# Patient Record
Sex: FEMALE | Race: WHITE | NOT HISPANIC OR LATINO | Employment: FULL TIME | URBAN - METROPOLITAN AREA
[De-identification: names, ages, dates, MRNs, and addresses within clinical notes are randomized per-mention and may not be internally consistent; named-entity substitution may affect disease eponyms.]

---
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
## 2019-02-26 ENCOUNTER — TRANSCRIBE ORDERS (OUTPATIENT)
Dept: LAB | Facility: CLINIC | Age: 19
End: 2019-02-26

## 2019-02-26 ENCOUNTER — HOSPITAL ENCOUNTER (OUTPATIENT)
Dept: CT IMAGING | Facility: HOSPITAL | Age: 19
Discharge: HOME | End: 2019-02-26
Attending: NURSE PRACTITIONER
Payer: COMMERCIAL

## 2019-02-26 ENCOUNTER — APPOINTMENT (OUTPATIENT)
Dept: LAB | Facility: CLINIC | Age: 19
End: 2019-02-26
Payer: COMMERCIAL

## 2019-02-26 ENCOUNTER — OFFICE VISIT (OUTPATIENT)
Dept: FAMILY MEDICINE CLINIC | Facility: CLINIC | Age: 19
End: 2019-02-26
Payer: COMMERCIAL

## 2019-02-26 ENCOUNTER — TELEPHONE (OUTPATIENT)
Dept: FAMILY MEDICINE CLINIC | Facility: CLINIC | Age: 19
End: 2019-02-26

## 2019-02-26 VITALS
OXYGEN SATURATION: 96 % | TEMPERATURE: 98.7 F | SYSTOLIC BLOOD PRESSURE: 110 MMHG | HEIGHT: 63 IN | WEIGHT: 109 LBS | BODY MASS INDEX: 19.31 KG/M2 | HEART RATE: 88 BPM | DIASTOLIC BLOOD PRESSURE: 70 MMHG

## 2019-02-26 DIAGNOSIS — R06.00 DYSPNEA, UNSPECIFIED TYPE: ICD-10-CM

## 2019-02-26 DIAGNOSIS — R00.2 PALPITATIONS: ICD-10-CM

## 2019-02-26 DIAGNOSIS — R06.00 DYSPNEA, UNSPECIFIED TYPE: Primary | ICD-10-CM

## 2019-02-26 DIAGNOSIS — R07.89 OTHER CHEST PAIN: ICD-10-CM

## 2019-02-26 DIAGNOSIS — F41.9 ANXIETY: Primary | ICD-10-CM

## 2019-02-26 DIAGNOSIS — F41.9 ANXIETY: ICD-10-CM

## 2019-02-26 LAB
ALBUMIN SERPL BCP-MCNC: 4.3 G/DL (ref 3.5–5)
ALP SERPL-CCNC: 55 U/L (ref 46–116)
ALT SERPL W P-5'-P-CCNC: 25 U/L (ref 12–78)
ANION GAP SERPL CALCULATED.3IONS-SCNC: 11 MMOL/L (ref 4–13)
APTT PPP: 34 SECONDS (ref 26–38)
AST SERPL W P-5'-P-CCNC: 20 U/L (ref 5–45)
BASOPHILS # BLD AUTO: 0.02 THOUSANDS/ΜL (ref 0–0.1)
BASOPHILS NFR BLD AUTO: 0 % (ref 0–1)
BILIRUB SERPL-MCNC: 0.4 MG/DL (ref 0.2–1)
BUN SERPL-MCNC: 11 MG/DL (ref 5–25)
CALCIUM SERPL-MCNC: 9.6 MG/DL (ref 8.3–10.1)
CHLORIDE SERPL-SCNC: 100 MMOL/L (ref 100–108)
CO2 SERPL-SCNC: 26 MMOL/L (ref 21–32)
CREAT SERPL-MCNC: 0.71 MG/DL (ref 0.6–1.3)
DEPRECATED D DIMER PPP: 293 NG/ML (FEU)
EOSINOPHIL # BLD AUTO: 0.04 THOUSAND/ΜL (ref 0–0.61)
EOSINOPHIL NFR BLD AUTO: 1 % (ref 0–6)
ERYTHROCYTE [DISTWIDTH] IN BLOOD BY AUTOMATED COUNT: 11.6 % (ref 11.6–15.1)
GFR SERPL CREATININE-BSD FRML MDRD: 123 ML/MIN/1.73SQ M
GLUCOSE SERPL-MCNC: 77 MG/DL (ref 65–140)
HCT VFR BLD AUTO: 43 % (ref 34.8–46.1)
HGB BLD-MCNC: 14.9 G/DL (ref 11.5–15.4)
IMM GRANULOCYTES # BLD AUTO: 0.01 THOUSAND/UL (ref 0–0.2)
IMM GRANULOCYTES NFR BLD AUTO: 0 % (ref 0–2)
INR PPP: 1.05 (ref 0.86–1.17)
LYMPHOCYTES # BLD AUTO: 1.74 THOUSANDS/ΜL (ref 0.6–4.47)
LYMPHOCYTES NFR BLD AUTO: 32 % (ref 14–44)
MCH RBC QN AUTO: 31 PG (ref 26.8–34.3)
MCHC RBC AUTO-ENTMCNC: 34.7 G/DL (ref 31.4–37.4)
MCV RBC AUTO: 90 FL (ref 82–98)
MONOCYTES # BLD AUTO: 0.51 THOUSAND/ΜL (ref 0.17–1.22)
MONOCYTES NFR BLD AUTO: 10 % (ref 4–12)
NEUTROPHILS # BLD AUTO: 3.07 THOUSANDS/ΜL (ref 1.85–7.62)
NEUTS SEG NFR BLD AUTO: 57 % (ref 43–75)
NRBC BLD AUTO-RTO: 0 /100 WBCS
PLATELET # BLD AUTO: 205 THOUSANDS/UL (ref 149–390)
PMV BLD AUTO: 10.8 FL (ref 8.9–12.7)
POTASSIUM SERPL-SCNC: 3.9 MMOL/L (ref 3.5–5.3)
PROT SERPL-MCNC: 8.2 G/DL (ref 6.4–8.2)
PROTHROMBIN TIME: 13.4 SECONDS (ref 11.8–14.2)
RBC # BLD AUTO: 4.8 MILLION/UL (ref 3.81–5.12)
SODIUM SERPL-SCNC: 137 MMOL/L (ref 136–145)
TSH SERPL DL<=0.05 MIU/L-ACNC: 1.33 UIU/ML (ref 0.46–3.98)
WBC # BLD AUTO: 5.39 THOUSAND/UL (ref 4.31–10.16)

## 2019-02-26 PROCEDURE — 80053 COMPREHEN METABOLIC PANEL: CPT

## 2019-02-26 PROCEDURE — 71275 CT ANGIOGRAPHY CHEST: CPT

## 2019-02-26 PROCEDURE — 93000 ELECTROCARDIOGRAM COMPLETE: CPT | Performed by: NURSE PRACTITIONER

## 2019-02-26 PROCEDURE — 85025 COMPLETE CBC W/AUTO DIFF WBC: CPT

## 2019-02-26 PROCEDURE — 85730 THROMBOPLASTIN TIME PARTIAL: CPT

## 2019-02-26 PROCEDURE — 85379 FIBRIN DEGRADATION QUANT: CPT

## 2019-02-26 PROCEDURE — 99214 OFFICE O/P EST MOD 30 MIN: CPT | Performed by: NURSE PRACTITIONER

## 2019-02-26 PROCEDURE — 85610 PROTHROMBIN TIME: CPT

## 2019-02-26 PROCEDURE — 83835 ASSAY OF METANEPHRINES: CPT

## 2019-02-26 PROCEDURE — 36415 COLL VENOUS BLD VENIPUNCTURE: CPT

## 2019-02-26 PROCEDURE — 84443 ASSAY THYROID STIM HORMONE: CPT

## 2019-02-26 RX ORDER — SERTRALINE HYDROCHLORIDE 25 MG/1
TABLET, FILM COATED ORAL
Qty: 30 TABLET | Refills: 1 | Status: SHIPPED | OUTPATIENT
Start: 2019-02-26 | End: 2019-03-12 | Stop reason: SDUPTHER

## 2019-02-26 RX ADMIN — IOHEXOL 85 ML: 350 INJECTION, SOLUTION INTRAVENOUS at 17:15

## 2019-02-26 NOTE — PROGRESS NOTES
Assessment/Plan:         Problem List Items Addressed This Visit     Chest pain + dyspnea + palpitations  --MAY possibly be anxiety related, but the fact that she is on an OCP, her symptoms started abruptly, and have been constant/continuous since, raises the possibility of PE  Discussed with Dr Symone Montiel  STAT labs and imaging ordered  CT scheduled for 5pm (two hours from now)  Will call with results  --If workup negative, she may benefit from trial of low dose SSRI (Zoloft) which was helpful for her mom (discussed)  --ER for worsening    Relevant Orders    CBC and differential    Comprehensive metabolic panel    TSH, 3rd generation with Free T4 reflex    D-dimer, quantitative    Metanephrine, Fractionated Plasma Free    CTA chest pe study    Protime-INR, APTT    POCT ECG (Completed)-->NSR, mildly tachycardic (84) for her age and fitness level             Subjective:      Patient ID: Alex Don is a 20 y o  female  Here with mom for complaints of chest heaviness, racing heart, dyspnea, sweats x 1 week  Symptoms started abruptly and have continued continuously since  Prior to this would get these symptoms periodically, which she attributed to anxiety  Mom notes that they are in the process of moving into in-laws house which has been a bit stressful  No other emotional triggers over the past week that would account for her symptoms, however  No N/V, cough, headache, dizziness, fever, calf pain/swelling, recent travel  No fatigue, weakness  On OCP x 6 months without issues  Mom wonders if this may be contributing to her symptoms, however  Period somewhat irregular since starting  No spotting  Denies feeling down/depressed  Lives with parents, two sisters whom she gets along fairly well with  Works full time at [employer]  No significant home or work stressors  Taking some time off from school ([employer]--thinking about nursing)  Supportive boyfriend   Denies feeling threatened, unsafe  No smoking, alcohol, drug use  No caffeine  Mom with history of depression/anxiety  Has done well on Zoloft  Mom denies PH/FH clotting disorders  The following portions of the patient's history were reviewed and updated as appropriate: current medications, past family history, past medical history, past social history, past surgical history and problem list       Review of Systems   Constitutional: Negative for fatigue and fever  HENT: Negative for sore throat  Respiratory: Positive for shortness of breath  Negative for cough  Cardiovascular: Positive for chest pain and palpitations  Negative for leg swelling  Gastrointestinal: Negative for abdominal pain, nausea and vomiting  Neurological: Negative for dizziness and headaches  Psychiatric/Behavioral:        Per HPI         Objective:      /70 (BP Location: Left arm, Patient Position: Sitting, Cuff Size: Standard)   Pulse 88   Temp 98 7 °F (37 1 °C)   Ht 5' 3" (1 6 m)   Wt 49 4 kg (109 lb)   SpO2 96%   BMI 19 31 kg/m²          Physical Exam   Constitutional: She is oriented to person, place, and time  She appears well-developed and well-nourished  HENT:   Head: Normocephalic  Right Ear: External ear normal    Left Ear: External ear normal    Nose: Nose normal    Mouth/Throat: Oropharynx is clear and moist    Eyes: Pupils are equal, round, and reactive to light  Conjunctivae are normal    Neck: Normal range of motion  Neck supple  Cardiovascular: Normal rate, regular rhythm and normal heart sounds  Mildly tachycardic at rest (84)  Pulmonary/Chest: Breath sounds normal  No respiratory distress  RR=20, non-labored at rest  No cough noted  Abdominal: Soft  Bowel sounds are normal  There is no tenderness  Musculoskeletal: She exhibits no edema or tenderness  No calf pain or tenderness  Lymphadenopathy:     She has no cervical adenopathy     Neurological: She is alert and oriented to person, place, and time  She has normal reflexes  Skin: Skin is warm and dry  Psychiatric: She has a normal mood and affect  Her behavior is normal  Judgment and thought content normal    Relaxed  Does not seem anxious

## 2019-03-02 LAB
METANEPH FREE SERPL-MCNC: 46 PG/ML (ref 0–62)
NORMETANEPHRINE SERPL-MCNC: 65 PG/ML (ref 0–145)

## 2019-03-05 ENCOUNTER — TELEPHONE (OUTPATIENT)
Dept: FAMILY MEDICINE CLINIC | Facility: CLINIC | Age: 19
End: 2019-03-05

## 2019-03-05 NOTE — TELEPHONE ENCOUNTER
----- Message from Joi Wu, [address] sent at 3/5/2019  8:13 AM EST -----  Can we let Woodrow's mom know that her last pending blood test (for adrenal condition called "pheochromocytoma") came back normal   Thanks!

## 2019-03-12 ENCOUNTER — OFFICE VISIT (OUTPATIENT)
Dept: FAMILY MEDICINE CLINIC | Facility: CLINIC | Age: 19
End: 2019-03-12
Payer: COMMERCIAL

## 2019-03-12 VITALS
DIASTOLIC BLOOD PRESSURE: 70 MMHG | BODY MASS INDEX: 19.31 KG/M2 | SYSTOLIC BLOOD PRESSURE: 110 MMHG | RESPIRATION RATE: 16 BRPM | HEART RATE: 68 BPM | HEIGHT: 63 IN | TEMPERATURE: 96.6 F | WEIGHT: 109 LBS

## 2019-03-12 DIAGNOSIS — F41.9 ANXIETY: ICD-10-CM

## 2019-03-12 PROCEDURE — 3008F BODY MASS INDEX DOCD: CPT | Performed by: NURSE PRACTITIONER

## 2019-03-12 PROCEDURE — 1036F TOBACCO NON-USER: CPT | Performed by: NURSE PRACTITIONER

## 2019-03-12 PROCEDURE — 99214 OFFICE O/P EST MOD 30 MIN: CPT | Performed by: NURSE PRACTITIONER

## 2019-03-12 RX ORDER — SERTRALINE HYDROCHLORIDE 25 MG/1
TABLET, FILM COATED ORAL
Qty: 30 TABLET | Refills: 5 | Status: SHIPPED | OUTPATIENT
Start: 2019-03-12 | End: 2019-03-15 | Stop reason: SDUPTHER

## 2019-03-12 NOTE — PROGRESS NOTES
Assessment/Plan:         Problem List Items Addressed This Visit     Anxiety  --Improved on Zoloft  Wishes to continue unchanged for now  --Regular exercise, other stress relieving measures encouraged  --Declines need for counsellor at this time  Relevant Medications    sertraline (ZOLOFT) 25 mg tablet QD          RTO 3 months      Subjective:      Patient ID: Genevieve Rose is a 20 y o  female  Here as 2 week follow-up to anxiety  See previous note for details  They are in the process of moving into her grandmother's house who will be moving to [place]  This is a primary stressor for her, but she feels like she is coping adequately  Good support system  Exercise helps  Sleeping OK at night  Chest heaviness, heart racing, dyspnea resolved since starting Zoloft, which she is tolerating without AE's  Seems to be helping  No upset stomach, nausea, loose stools  No headaches  Denies feeling down/depressed  Not currently seeing counsellor and does not feel the need to do so at this time  No smoking, alcohol, drug use  No caffeine  The following portions of the patient's history were reviewed and updated as appropriate: current medications, past family history, past medical history, past social history, past surgical history and problem list     Review of Systems   Constitutional: Negative for fatigue and unexpected weight change  Respiratory: Negative for chest tightness and shortness of breath  Cardiovascular: Negative for chest pain and palpitations  Gastrointestinal: Negative for abdominal pain  Neurological: Negative for headaches  Psychiatric/Behavioral: Negative for dysphoric mood  Per HPI         Objective:      /70   Pulse 68   Temp (!) 96 6 °F (35 9 °C)   Resp 16   Ht 5' 3" (1 6 m)   Wt 49 4 kg (109 lb)   LMP 03/12/2019 (Approximate)   Breastfeeding?  No   BMI 19 31 kg/m²          Physical Exam   Constitutional: She is oriented to person, place, and time  She appears well-developed  Cardiovascular: Normal rate and regular rhythm  Pulmonary/Chest: Effort normal and breath sounds normal    Neurological: She is alert and oriented to person, place, and time  Psychiatric: She has a normal mood and affect   Her behavior is normal  Judgment and thought content normal

## 2019-03-15 ENCOUNTER — TELEPHONE (OUTPATIENT)
Dept: FAMILY MEDICINE CLINIC | Facility: OTHER | Age: 19
End: 2019-03-15

## 2019-03-15 DIAGNOSIS — F41.9 ANXIETY: ICD-10-CM

## 2019-03-15 RX ORDER — SERTRALINE HYDROCHLORIDE 25 MG/1
TABLET, FILM COATED ORAL
Qty: 30 TABLET | Refills: 5 | Status: SHIPPED | OUTPATIENT
Start: 2019-03-15 | End: 2019-07-19 | Stop reason: DRUGHIGH

## 2019-03-15 NOTE — TELEPHONE ENCOUNTER
OK, thanks  That's what I thought  Not sure why there was an issue with the insurance  But just to clear things up, I did send a new Rx for ONE tablet only (taking out the part about "May increase to 2 tablets after a week")   Thanks

## 2019-03-15 NOTE — TELEPHONE ENCOUNTER
I was under the impression that she was still taking ONE 25 mg tablet of the sertraline, rather than two 25 mg tablets (= 50 mg total)  Can we call her to verify the dose? If she is taking two 25 mg tablets, I will change the Rx to 50 mg   Thanks

## 2019-03-15 NOTE — TELEPHONE ENCOUNTER
Ellis  (pharmacisit) called and the patients rx for Sertraline needs to be changed to 50 mg and once a day so insurance will pay for it   Thank you

## 2019-07-19 ENCOUNTER — OFFICE VISIT (OUTPATIENT)
Dept: FAMILY MEDICINE CLINIC | Facility: CLINIC | Age: 19
End: 2019-07-19
Payer: COMMERCIAL

## 2019-07-19 VITALS
BODY MASS INDEX: 19.31 KG/M2 | HEIGHT: 63 IN | HEART RATE: 80 BPM | WEIGHT: 109 LBS | DIASTOLIC BLOOD PRESSURE: 60 MMHG | RESPIRATION RATE: 16 BRPM | TEMPERATURE: 97.9 F | SYSTOLIC BLOOD PRESSURE: 102 MMHG

## 2019-07-19 DIAGNOSIS — F41.1 GAD (GENERALIZED ANXIETY DISORDER): Primary | ICD-10-CM

## 2019-07-19 PROCEDURE — 99213 OFFICE O/P EST LOW 20 MIN: CPT | Performed by: FAMILY MEDICINE

## 2019-07-19 PROCEDURE — 1036F TOBACCO NON-USER: CPT | Performed by: FAMILY MEDICINE

## 2019-07-19 PROCEDURE — 3008F BODY MASS INDEX DOCD: CPT | Performed by: FAMILY MEDICINE

## 2019-07-19 NOTE — PROGRESS NOTES
Assessment/Plan:         Diagnoses and all orders for this visit:    ROXANA (generalized anxiety disorder)  -     Discontinue: sertraline (ZOLOFT) 50 mg tablet; Take 1 tablet (50 mg total) by mouth daily for 30 days  -     sertraline (ZOLOFT) 50 mg tablet; Take 1 tablet (50 mg total) by mouth daily for 30 days        Increase dose of Sertraline to 50 mg daily  I reviewed usage and side effects  OV 3 to 6 months  Advised to call if any problems  Patient ID: Saad Figueroa is a 20 y o  female  Follow up visit  History of ROXANA on Sertraline 25 mg daily since 02/2019  Patient reports some improvement but would like to change dose  No side effects  Current medications reviewed  Labs 02/2019 reviewed see note  The following portions of the patient's history were reviewed and updated as appropriate: allergies, current medications, past family history, past medical history, past social history, past surgical history and problem list     Review of Systems   Constitutional: Negative for appetite change, fatigue and unexpected weight change  Gastrointestinal: Negative for constipation, diarrhea, nausea and vomiting  Skin: Negative for rash  Neurological: Negative for dizziness and headaches  Psychiatric/Behavioral: Negative for agitation, dysphoric mood, sleep disturbance and suicidal ideas  The patient is nervous/anxious  See HPI         Objective:      /60   Pulse 80   Temp 97 9 °F (36 6 °C)   Resp 16   Ht 5' 3" (1 6 m)   Wt 49 4 kg (109 lb)   LMP 06/26/2019 (Approximate)   Breastfeeding? No   BMI 19 31 kg/m²          Physical Exam   Constitutional: She appears well-developed and well-nourished  No distress  Psychiatric: She has a normal mood and affect   Her speech is normal and behavior is normal  Judgment and thought content normal  Cognition and memory are normal            Recent Results (from the past 6552 hour(s))   CBC and differential    Collection Time: 02/26/19  5:46 PM Result Value Ref Range    WBC 5 39 4 31 - 10 16 Thousand/uL    RBC 4 80 3 81 - 5 12 Million/uL    Hemoglobin 14 9 11 5 - 15 4 g/dL    Hematocrit 43 0 34 8 - 46 1 %    MCV 90 82 - 98 fL    MCH 31 0 26 8 - 34 3 pg    MCHC 34 7 31 4 - 37 4 g/dL    RDW 11 6 11 6 - 15 1 %    MPV 10 8 8 9 - 12 7 fL    Platelets 229 845 - 685 Thousands/uL    nRBC 0 /100 WBCs    Neutrophils Relative 57 43 - 75 %    Immat GRANS % 0 0 - 2 %    Lymphocytes Relative 32 14 - 44 %    Monocytes Relative 10 4 - 12 %    Eosinophils Relative 1 0 - 6 %    Basophils Relative 0 0 - 1 %    Neutrophils Absolute 3 07 1 85 - 7 62 Thousands/µL    Immature Grans Absolute 0 01 0 00 - 0 20 Thousand/uL    Lymphocytes Absolute 1 74 0 60 - 4 47 Thousands/µL    Monocytes Absolute 0 51 0 17 - 1 22 Thousand/µL    Eosinophils Absolute 0 04 0 00 - 0 61 Thousand/µL    Basophils Absolute 0 02 0 00 - 0 10 Thousands/µL   Comprehensive metabolic panel    Collection Time: 02/26/19  5:46 PM   Result Value Ref Range    Sodium 137 136 - 145 mmol/L    Potassium 3 9 3 5 - 5 3 mmol/L    Chloride 100 100 - 108 mmol/L    CO2 26 21 - 32 mmol/L    ANION GAP 11 4 - 13 mmol/L    BUN 11 5 - 25 mg/dL    Creatinine 0 71 0 60 - 1 30 mg/dL    Glucose 77 65 - 140 mg/dL    Calcium 9 6 8 3 - 10 1 mg/dL    AST 20 5 - 45 U/L    ALT 25 12 - 78 U/L    Alkaline Phosphatase 55 46 - 116 U/L    Total Protein 8 2 6 4 - 8 2 g/dL    Albumin 4 3 3 5 - 5 0 g/dL    Total Bilirubin 0 40 0 20 - 1 00 mg/dL    eGFR 123 ml/min/1 73sq m   TSH, 3rd generation with Free T4 reflex    Collection Time: 02/26/19  5:46 PM   Result Value Ref Range    TSH 3RD GENERATON 1 333 0 463 - 3 980 uIU/mL   D-dimer, quantitative    Collection Time: 02/26/19  5:46 PM   Result Value Ref Range    D-Dimer, Quant 293 <500 ng/ml (FEU)   Metanephrine, Fractionated Plasma Free    Collection Time: 02/26/19  5:46 PM   Result Value Ref Range    Normetanephrine, Free 65 0 - 145 pg/mL    Metanephrine, Free 46 0 - 62 pg/mL   Protime-INR Collection Time: 02/26/19  5:46 PM   Result Value Ref Range    Protime 13 4 11 8 - 14 2 seconds    INR 1 05 0 86 - 1 17   APTT    Collection Time: 02/26/19  5:46 PM   Result Value Ref Range    PTT 34 26 - 38 seconds

## 2020-09-16 ENCOUNTER — OFFICE VISIT (OUTPATIENT)
Dept: FAMILY MEDICINE CLINIC | Facility: CLINIC | Age: 20
End: 2020-09-16
Payer: COMMERCIAL

## 2020-09-16 VITALS
BODY MASS INDEX: 18.95 KG/M2 | SYSTOLIC BLOOD PRESSURE: 98 MMHG | DIASTOLIC BLOOD PRESSURE: 70 MMHG | HEIGHT: 64 IN | OXYGEN SATURATION: 99 % | TEMPERATURE: 98.2 F | HEART RATE: 80 BPM | WEIGHT: 111 LBS

## 2020-09-16 DIAGNOSIS — F33.1 MODERATE EPISODE OF RECURRENT MAJOR DEPRESSIVE DISORDER (HCC): Primary | ICD-10-CM

## 2020-09-16 DIAGNOSIS — F41.0 GENERALIZED ANXIETY DISORDER WITH PANIC ATTACKS: ICD-10-CM

## 2020-09-16 DIAGNOSIS — F41.1 GENERALIZED ANXIETY DISORDER WITH PANIC ATTACKS: ICD-10-CM

## 2020-09-16 DIAGNOSIS — R10.9 FUNCTIONAL ABDOMINAL PAIN SYNDROME: ICD-10-CM

## 2020-09-16 PROCEDURE — 99395 PREV VISIT EST AGE 18-39: CPT | Performed by: FAMILY MEDICINE

## 2020-09-16 RX ORDER — ESCITALOPRAM OXALATE 5 MG/1
5 TABLET ORAL DAILY
Qty: 30 TABLET | Refills: 1 | Status: SHIPPED | OUTPATIENT
Start: 2020-09-16 | End: 2020-10-08

## 2020-09-16 NOTE — ASSESSMENT & PLAN NOTE
Patient with complaint of periumbilical abdominal pain, she has no significant red flag symptoms, there are no significant findings on exam, believe her symptoms are due to functional abdominal pain  Recommended ongoing use of Tums for dyspepsia, she can trial Pepcid as well also recommended use of Pepto-Bismol for her abdominal pain symptoms  It is possible she has some irritable bowel syndrome though she has not endorsed any change in her stool habits  Also discussed that her abdominal pain could be secondary to her current anxiety and depressive symptoms  Had extensive discussion about healthy dietary habits given her poor eating habits  She plans to attempt to make some changes to her eating habits
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
## 2020-09-16 NOTE — ASSESSMENT & PLAN NOTE
Patient with elevated PHQ score today, signs and symptoms of major depressive disorder as well as generalized anxiety disorder with panic attacks  She had previously tried Zoloft, though she did not like the way it made her feel so she self discontinued this medication  Will try Lexapro 5 mg starting today, discussed that if she tolerates the medication and does not have significant side effects and she can increase the dose to 10 mg  Counseled on potential side effects, specifically discussed increased risk of suicidal thoughts or actions in adolescents and young adults when starting an SSRI or other antidepressant medication  Advised patient to discontinue antidepressant, call a friend/family member or the suicide hotline, and/or go to the ER should suicidal thoughts arise  Patient expressed understanding  She is interested in seeing a mental health specialist, will have her schedule for our behavioral health specialist who sees patients in the office  Will plan to check in with patient in the next 4-6 weeks, and plan for in-person follow-up within the next 3 months

## 2020-09-25 ENCOUNTER — SOCIAL WORK (OUTPATIENT)
Dept: BEHAVIORAL/MENTAL HEALTH CLINIC | Facility: CLINIC | Age: 20
End: 2020-09-25
Payer: COMMERCIAL

## 2020-09-25 DIAGNOSIS — F32.A ANXIETY AND DEPRESSION: Primary | ICD-10-CM

## 2020-09-25 DIAGNOSIS — F41.9 ANXIETY AND DEPRESSION: Primary | ICD-10-CM

## 2020-09-25 PROCEDURE — 90834 PSYTX W PT 45 MINUTES: CPT | Performed by: SOCIAL WORKER
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
## 2020-10-08 DIAGNOSIS — F41.0 GENERALIZED ANXIETY DISORDER WITH PANIC ATTACKS: ICD-10-CM

## 2020-10-08 DIAGNOSIS — F41.1 GENERALIZED ANXIETY DISORDER WITH PANIC ATTACKS: ICD-10-CM

## 2020-10-08 DIAGNOSIS — F33.1 MODERATE EPISODE OF RECURRENT MAJOR DEPRESSIVE DISORDER (HCC): ICD-10-CM

## 2020-10-08 RX ORDER — ESCITALOPRAM OXALATE 5 MG/1
TABLET ORAL
Qty: 30 TABLET | Refills: 1 | Status: SHIPPED | OUTPATIENT
Start: 2020-10-08 | End: 2020-11-05

## 2020-11-05 DIAGNOSIS — F41.0 GENERALIZED ANXIETY DISORDER WITH PANIC ATTACKS: ICD-10-CM

## 2020-11-05 DIAGNOSIS — F41.1 GENERALIZED ANXIETY DISORDER WITH PANIC ATTACKS: ICD-10-CM

## 2020-11-05 DIAGNOSIS — F33.1 MODERATE EPISODE OF RECURRENT MAJOR DEPRESSIVE DISORDER (HCC): ICD-10-CM

## 2020-11-05 RX ORDER — ESCITALOPRAM OXALATE 5 MG/1
TABLET ORAL
Qty: 30 TABLET | Refills: 1 | Status: SHIPPED | OUTPATIENT
Start: 2020-11-05 | End: 2020-12-10

## 2020-12-08 DIAGNOSIS — F33.1 MODERATE EPISODE OF RECURRENT MAJOR DEPRESSIVE DISORDER (HCC): ICD-10-CM

## 2020-12-08 DIAGNOSIS — F41.0 GENERALIZED ANXIETY DISORDER WITH PANIC ATTACKS: ICD-10-CM

## 2020-12-08 DIAGNOSIS — F41.1 GENERALIZED ANXIETY DISORDER WITH PANIC ATTACKS: ICD-10-CM

## 2020-12-10 RX ORDER — ESCITALOPRAM OXALATE 5 MG/1
TABLET ORAL
Qty: 30 TABLET | Refills: 1 | Status: SHIPPED | OUTPATIENT
Start: 2020-12-10 | End: 2021-01-07

## 2021-01-07 DIAGNOSIS — F41.1 GENERALIZED ANXIETY DISORDER WITH PANIC ATTACKS: ICD-10-CM

## 2021-01-07 DIAGNOSIS — F33.1 MODERATE EPISODE OF RECURRENT MAJOR DEPRESSIVE DISORDER (HCC): ICD-10-CM

## 2021-01-07 DIAGNOSIS — F41.0 GENERALIZED ANXIETY DISORDER WITH PANIC ATTACKS: ICD-10-CM

## 2021-01-07 RX ORDER — ESCITALOPRAM OXALATE 5 MG/1
TABLET ORAL
Qty: 30 TABLET | Refills: 1 | Status: SHIPPED | OUTPATIENT
Start: 2021-01-07 | End: 2021-02-01

## 2021-02-01 DIAGNOSIS — F33.1 MODERATE EPISODE OF RECURRENT MAJOR DEPRESSIVE DISORDER (HCC): ICD-10-CM

## 2021-02-01 DIAGNOSIS — F41.0 GENERALIZED ANXIETY DISORDER WITH PANIC ATTACKS: ICD-10-CM

## 2021-02-01 DIAGNOSIS — F41.1 GENERALIZED ANXIETY DISORDER WITH PANIC ATTACKS: ICD-10-CM

## 2021-02-01 RX ORDER — ESCITALOPRAM OXALATE 5 MG/1
TABLET ORAL
Qty: 30 TABLET | Refills: 1 | Status: SHIPPED | OUTPATIENT
Start: 2021-02-01 | End: 2021-03-07

## 2021-03-06 DIAGNOSIS — F41.0 GENERALIZED ANXIETY DISORDER WITH PANIC ATTACKS: ICD-10-CM

## 2021-03-06 DIAGNOSIS — F41.1 GENERALIZED ANXIETY DISORDER WITH PANIC ATTACKS: ICD-10-CM

## 2021-03-06 DIAGNOSIS — F33.1 MODERATE EPISODE OF RECURRENT MAJOR DEPRESSIVE DISORDER (HCC): ICD-10-CM

## 2021-03-07 RX ORDER — ESCITALOPRAM OXALATE 5 MG/1
TABLET ORAL
Qty: 30 TABLET | Refills: 1 | Status: SHIPPED | OUTPATIENT
Start: 2021-03-07

## 2021-03-30 DIAGNOSIS — F33.1 MODERATE EPISODE OF RECURRENT MAJOR DEPRESSIVE DISORDER (HCC): ICD-10-CM

## 2021-03-30 DIAGNOSIS — F41.0 GENERALIZED ANXIETY DISORDER WITH PANIC ATTACKS: ICD-10-CM

## 2021-03-30 DIAGNOSIS — F41.1 GENERALIZED ANXIETY DISORDER WITH PANIC ATTACKS: ICD-10-CM

## 2021-03-30 RX ORDER — ESCITALOPRAM OXALATE 5 MG/1
TABLET ORAL
Qty: 30 TABLET | Refills: 1 | OUTPATIENT
Start: 2021-03-30

## 2021-03-30 NOTE — TELEPHONE ENCOUNTER
Patient does not want refill  Would like to ween off medication  How should she do so? Please advise  Thank you

## 2021-03-30 NOTE — TELEPHONE ENCOUNTER
Please have patient schedule a visit to discuss weaning off the medication and instructions for further management

## 2021-04-06 NOTE — PROGRESS NOTES
FAMILY MEDICINE PROGRESS NOTE    Date of Service: 21  Primary Care Provider:   Ayla Laboy MD       Name: Rubi Dyer       : 2000       Age:21 y o  Sex: female      MRN: [id number]      Chief Complaint:Follow-up (3 months)       ASSESSMENT and PLAN:  Rubi Dyer is a 24 y o  female with:     1  Periumbilical abdominal pain  Patient with persistent lower abdominal and pelvic pain, worse prior to menstrual cycle, possible ovarian cyst versus endometriosis versus interstitial cystitis versus IBS/functional abdominal pain  Will obtain US of abdomen and pelvis to evaluate for anatomical causes of pain  - US abdomen and pelvis with transvaginal; Future    2  Pelvic pain  - US abdomen and pelvis with transvaginal; Future    3  Moderate episode of recurrent major depressive disorder ([facility] [address] )  4  ROXANA (generalized anxiety disorder)  Symptoms of anxiety and depression have improved, patient wishes to come off of Lexapro, provided schedule for taper, counseled on potential withdrawal effects  SUBJECTIVE:  Rubi Dyer is a 24 y o  female who presents today with a chief complaint of Follow-up (3 months)  HPI     Depression/Anxiety   Patient was last seen in September for physical  She was started on Lexapro 5mg for anxiety and depression  She also complained of non-specific abdominal pain at that time  She reports her mood has improved since she moved out on her own, so she does not have to be around her dad which is stressful  She is not struggling with sleep any longer  She does wish to come off of Lexapro      PHQ-9 Depression Screening    PHQ-9:   Frequency of the following problems over the past two weeks:      Little interest or pleasure in doing things: 0 - not at all  Feeling down, depressed, or hopeless: 0 - not at all  Trouble falling or staying asleep, or sleeping too much: 0 - not at all  Feeling tired or having little energy: 0 - not at all  Poor appetite or overeatin - not at all  Feeling bad about yourself - or that you are a failure or have let yourself or your family down: 0 - not at all  Trouble concentrating on things, such as reading the newspaper or watching television: 0 - not at all  Moving or speaking so slowly that other people could have noticed  Or the opposite - being so fidgety or restless that you have been moving around a lot more than usual: 0 - not at all  Thoughts that you would be better off dead, or of hurting yourself in some way: 0 - not at all  PHQ-2 Score: 0  PHQ-9 Score: 0         Abdominal/Pelvic Pain  She reports abdominal pain, periumbilical for several months, this has been ongoing since her last visit  She reports that this happens 5 to 6 days a week  She will wake up with the pain, states it feels like a bad cramp  It will go away after she gets up and moves around  Sometimes the pain will last several hours  She reports that that pain is worse in the week before her menstrual cycle  She reports that she will get the pain when she eats fast food or eats junk food  The pain sometimes better when she has a bowel movement  She reports that she has regular bowel movements, not blood in her stool  She is on OCP, she did have some midcycle spotting in February about two weeks before her cycle  She did have some spotting this past cycle as well  Review of Systems   Gastrointestinal: Positive for abdominal pain  Negative for blood in stool, constipation, nausea and vomiting  Genitourinary: Positive for dyspareunia, menstrual problem (midcycle spotting) and pelvic pain  Negative for dysuria, frequency, urgency, vaginal bleeding, vaginal discharge and vaginal pain  Psychiatric/Behavioral: Negative for dysphoric mood and sleep disturbance  The patient is not nervous/anxious  I have reviewed the patient's Past Medical History      Current Outpatient Medications:     escitalopram (LEXAPRO) 5 mg tablet, TAKE 1 TABLET BY MOUTH EVERY DAY, Disp: 30 tablet, Rfl: 1    norgestimate-ethinyl estradiol (ORTHO TRI-CYCLEN LO) 0 18/0 215/0 25 MG-25 MCG per tablet, Take 1 tablet by mouth daily, Disp: , Rfl:     OBJECTIVE:  BP 98/66 (BP Location: Left arm, Patient Position: Sitting, Cuff Size: Standard)   Pulse 78   Temp (!) 97 4 °F (36 3 °C)   Ht 5' 4 4" (1 636 m)   Wt 52 8 kg (116 lb 8 oz)   SpO2 98%   BMI 19 75 kg/m²    BP Readings from Last 3 Encounters:   04/07/21 98/66   09/16/20 98/70   07/19/19 102/60      Wt Readings from Last 3 Encounters:   04/07/21 52 8 kg (116 lb 8 oz)   09/16/20 50 3 kg (111 lb)   07/19/19 49 4 kg (109 lb)      Physical Exam  Constitutional:       General: She is not in acute distress  Appearance: Normal appearance  She is normal weight  She is not ill-appearing or toxic-appearing  HENT:      Head: Normocephalic and atraumatic  Right Ear: External ear normal       Left Ear: External ear normal    Eyes:      Extraocular Movements: Extraocular movements intact  Conjunctiva/sclera: Conjunctivae normal    Neck:      Musculoskeletal: Normal range of motion and neck supple  Cardiovascular:      Rate and Rhythm: Normal rate and regular rhythm  Heart sounds: Normal heart sounds  No murmur  Pulmonary:      Effort: Pulmonary effort is normal  No respiratory distress  Breath sounds: Normal breath sounds  Abdominal:      General: Bowel sounds are normal  There is no distension  Palpations: Abdomen is soft  Tenderness: There is abdominal tenderness in the periumbilical area  There is no guarding or rebound  Comments: Pain distal to the umbilicus, no masses  Abdomen is soft, non distended    Musculoskeletal: Normal range of motion  Skin:     General: Skin is warm and dry  Findings: No erythema or rash  Neurological:      General: No focal deficit present  Mental Status: She is alert and oriented to person, place, and time     Psychiatric:         Mood and Affect: Mood normal  Behavior: Behavior normal                 Return if symptoms worsen or fail to improve  Gigi Vicente MD    Note: Portions of the record have been created with voice recognition software  Occasional wrong word or "sound a like" substitutions may have occurred due to the inherent limitations of voice recognition software  Read the chart carefully and recognize, using context, where substitutions have occurred

## 2021-04-07 ENCOUNTER — OFFICE VISIT (OUTPATIENT)
Dept: FAMILY MEDICINE CLINIC | Facility: CLINIC | Age: 21
End: 2021-04-07

## 2021-04-07 VITALS
SYSTOLIC BLOOD PRESSURE: 98 MMHG | TEMPERATURE: 97.4 F | HEIGHT: 64 IN | HEART RATE: 78 BPM | OXYGEN SATURATION: 98 % | DIASTOLIC BLOOD PRESSURE: 66 MMHG | BODY MASS INDEX: 19.89 KG/M2 | WEIGHT: 116.5 LBS

## 2021-04-07 DIAGNOSIS — F41.1 GAD (GENERALIZED ANXIETY DISORDER): ICD-10-CM

## 2021-04-07 DIAGNOSIS — R10.2 PELVIC PAIN: ICD-10-CM

## 2021-04-07 DIAGNOSIS — F33.1 MODERATE EPISODE OF RECURRENT MAJOR DEPRESSIVE DISORDER (HCC): ICD-10-CM

## 2021-04-07 DIAGNOSIS — R10.33 PERIUMBILICAL ABDOMINAL PAIN: Primary | ICD-10-CM

## 2021-04-07 PROBLEM — R00.2 PALPITATIONS: Status: RESOLVED | Noted: 2019-02-26 | Resolved: 2021-04-07

## 2021-04-07 PROBLEM — R10.9 FUNCTIONAL ABDOMINAL PAIN SYNDROME: Status: RESOLVED | Noted: 2020-09-16 | Resolved: 2021-04-07

## 2021-04-07 PROBLEM — R07.89 OTHER CHEST PAIN: Status: RESOLVED | Noted: 2019-02-26 | Resolved: 2021-04-07

## 2021-04-07 PROCEDURE — 99213 OFFICE O/P EST LOW 20 MIN: CPT | Performed by: FAMILY MEDICINE

## 2021-04-07 NOTE — PATIENT INSTRUCTIONS
To taper off of Lexapro take 1/2 tablet (2 5 mg) daily for two weeks, then take 1/2 tablet every other day until you run out  Please call central scheduling to schedule US

## 2021-04-23 ENCOUNTER — TELEPHONE (OUTPATIENT)
Dept: ADMINISTRATIVE | Facility: OTHER | Age: 21
End: 2021-04-23

## 2021-04-23 NOTE — TELEPHONE ENCOUNTER
----- Message from Debbi Watson sent at 2021  1:46 PM EDT -----  Regarding: PAP RESULTS  GOOD AFTERNOON,  OUR PATIENT, OLAMIDE POND  2000 HAD A CERVICAL CANCER SCREENING DONE AT [facility] 2021  NOTE IS IN CHART, HOWEVER IT IS NOT RESULTED TO REFLECT THIS IN HEALTH MAINTENANCE  THANK YOU FOR YOUR HELP IN RESULTING/OBTAINING THIS INFORMATION

## 2021-04-26 NOTE — TELEPHONE ENCOUNTER
Upon review of the In Basket request we were able to locate, review, and update the patient chart as requested for Pap Smear (HPV) aka Cervical Cancer Screening  Any additional questions or concerns should be emailed to the Practice Liaisons via [email or web address] email, please do not reply via In Basket      Thank you  Giselle Vences

## 2022-03-18 ENCOUNTER — OFFICE VISIT (OUTPATIENT)
Dept: FAMILY MEDICINE CLINIC | Facility: CLINIC | Age: 22
End: 2022-03-18

## 2022-03-18 VITALS
BODY MASS INDEX: 19.81 KG/M2 | TEMPERATURE: 96 F | SYSTOLIC BLOOD PRESSURE: 108 MMHG | HEART RATE: 98 BPM | DIASTOLIC BLOOD PRESSURE: 64 MMHG | RESPIRATION RATE: 18 BRPM | HEIGHT: 64 IN | WEIGHT: 116 LBS | OXYGEN SATURATION: 98 %

## 2022-03-18 DIAGNOSIS — F33.2 SEVERE EPISODE OF RECURRENT MAJOR DEPRESSIVE DISORDER, WITHOUT PSYCHOTIC FEATURES (HCC): ICD-10-CM

## 2022-03-18 DIAGNOSIS — F41.1 GAD (GENERALIZED ANXIETY DISORDER): Primary | ICD-10-CM

## 2022-03-18 PROCEDURE — 99213 OFFICE O/P EST LOW 20 MIN: CPT | Performed by: PHYSICIAN ASSISTANT

## 2022-03-18 RX ORDER — HYDROXYZINE 50 MG/1
50 TABLET, FILM COATED ORAL 3 TIMES DAILY PRN
Qty: 30 TABLET | Refills: 0 | Status: SHIPPED | OUTPATIENT
Start: 2022-03-18

## 2022-03-18 NOTE — ASSESSMENT & PLAN NOTE
ROXANA-7 Flowsheet Screening      Most Recent Value   Over the last 2 weeks, how often have you been bothered by any of the following problems? Feeling nervous, anxious, or on edge 3   Not being able to stop or control worrying 3   Worrying too much about different things 3   Trouble relaxing 3   Being so restless that it is hard to sit still 3   Becoming easily annoyed or irritable 3   Feeling afraid as if something awful might happen 3   ROXANA-7 Total Score 21      Previously managed on Lexapro 5 mg but self d/c due to "feeling numb " Wants to avoid daily medications at this time  Denies any SI/HI  Strongly recommended increased physical activity  Recommended therapy for long term management  Has previously seen Esmond Najjar which was good but was cost prohibitive  - ordered Atarax 50 mg 30 tablets to be used PRN panic/severe anxiety   Reviewed SHANDA  Directed to FU with worsening symptoms

## 2022-03-18 NOTE — PROGRESS NOTES
Assessment/Plan:    ROXANA (generalized anxiety disorder)  ROXANA-7 Flowsheet Screening      Most Recent Value   Over the last 2 weeks, how often have you been bothered by any of the following problems? Feeling nervous, anxious, or on edge 3   Not being able to stop or control worrying 3   Worrying too much about different things 3   Trouble relaxing 3   Being so restless that it is hard to sit still 3   Becoming easily annoyed or irritable 3   Feeling afraid as if something awful might happen 3   ROXAAN-7 Total Score 21      Previously managed on Lexapro 5 mg but self d/c due to "feeling numb " Wants to avoid daily medications at this time  Denies any SI/HI  Strongly recommended increased physical activity  Recommended therapy for long term management  Has previously seen Malika Alvarado which was good but was cost prohibitive  - ordered Atarax 50 mg 30 tablets to be used PRN panic/severe anxiety  Reviewed SHANDA  Directed to FU with worsening symptoms    Severe episode of recurrent major depressive disorder, without psychotic features ([facility] [address] )  PHQ-2/9 Depression Screening    Little interest or pleasure in doing things: 2 - more than half the days  Feeling down, depressed, or hopeless: 3 - nearly every day  Trouble falling or staying asleep, or sleeping too much: 3 - nearly every day  Feeling tired or having little energy: 3 - nearly every day  Poor appetite or overeating: 3 - nearly every day  Feeling bad about yourself - or that you are a failure or have let yourself or your family down: 3 - nearly every day  Trouble concentrating on things, such as reading the newspaper or watching television: 2 - more than half the days  Moving or speaking so slowly that other people could have noticed   Or the opposite - being so fidgety or restless that you have been moving around a lot more than usual: 1 - several days  Thoughts that you would be better off dead, or of hurting yourself in some way: 0 - not at all  PHQ-9 Score: 20   PHQ-9 Interpretation: Severe depression                 Subjective:    Patient ID: Ed Kayser is a 20 y o  female  Pt is presenting today for "A lot of anxiety about everything," for past 3 months, worse the past 2 weeks  She was out of work from [employer] 3 months ago  She was out of work and continues to have chest pain, other triggers include money and pet   "2 weeks ago dog got sick the first time, and still dealing with him being sick  At the vet every day "    "Had anxiety and depression for a few years now," since childhood  She has been trying to work "as much as I can " Anxiety "builds up and explodes eventually "    Previously used lexapro 5 mg which helped but numbed her  Previously saw [facility] but does not have health insurance and it is too expensive  Mood impacts her home and at work  Anxiety  Symptoms include nervous/anxious behavior  Patient reports no chest pain, nausea, palpitations or shortness of breath  Her past medical history is significant for anxiety/panic attacks  Depression  Pertinent negatives include no arthralgias, chest pain, coughing, fatigue, fever, myalgias, nausea or sore throat  Panic Attack  Pertinent negatives include no arthralgias, chest pain, coughing, fatigue, fever, myalgias, nausea or sore throat  The following portions of the patient's history were reviewed and updated as appropriate: allergies, current medications and problem list     Review of Systems   Constitutional: Negative for fatigue, fever and unexpected weight change  HENT: Negative for rhinorrhea and sore throat  Respiratory: Negative for cough, shortness of breath and wheezing  Cardiovascular: Negative for chest pain, palpitations and leg swelling  Gastrointestinal: Negative for constipation, diarrhea and nausea  Musculoskeletal: Negative for arthralgias and myalgias  Psychiatric/Behavioral: Positive for depression, dysphoric mood and sleep disturbance   The patient is nervous/anxious  Objective:  /64 (BP Location: Left arm, Patient Position: Sitting, Cuff Size: Standard)   Pulse 98   Temp (!) 96 °F (35 6 °C)   Resp 18   Ht 5' 4" (1 626 m)   Wt 52 6 kg (116 lb)   LMP 03/08/2022   SpO2 98%   Breastfeeding No   BMI 19 91 kg/m²      Physical Exam  Vitals reviewed  Constitutional:       General: She is not in acute distress  Appearance: She is well-developed  She is not diaphoretic  HENT:      Head: Normocephalic and atraumatic  Eyes:      Pupils: Pupils are equal, round, and reactive to light  Cardiovascular:      Rate and Rhythm: Normal rate and regular rhythm  Heart sounds: Normal heart sounds  No murmur heard  No friction rub  No gallop  Pulmonary:      Effort: Pulmonary effort is normal  No respiratory distress  Breath sounds: Normal breath sounds  No wheezing or rales  Musculoskeletal:      Cervical back: Normal range of motion and neck supple  Skin:     General: Skin is warm  Neurological:      Mental Status: She is alert and oriented to person, place, and time  Mental status is at baseline  Psychiatric:         Mood and Affect: Mood is anxious and depressed  Affect is tearful  Behavior: Behavior normal          Thought Content:  Thought content normal

## 2022-03-18 NOTE — ASSESSMENT & PLAN NOTE
PHQ-2/9 Depression Screening    Little interest or pleasure in doing things: 2 - more than half the days  Feeling down, depressed, or hopeless: 3 - nearly every day  Trouble falling or staying asleep, or sleeping too much: 3 - nearly every day  Feeling tired or having little energy: 3 - nearly every day  Poor appetite or overeating: 3 - nearly every day  Feeling bad about yourself - or that you are a failure or have let yourself or your family down: 3 - nearly every day  Trouble concentrating on things, such as reading the newspaper or watching television: 2 - more than half the days  Moving or speaking so slowly that other people could have noticed   Or the opposite - being so fidgety or restless that you have been moving around a lot more than usual: 1 - several days  Thoughts that you would be better off dead, or of hurting yourself in some way: 0 - not at all  PHQ-9 Score: 20   PHQ-9 Interpretation: Severe depression

## 2022-11-14 ENCOUNTER — OFFICE VISIT (OUTPATIENT)
Dept: FAMILY MEDICINE CLINIC | Facility: CLINIC | Age: 22
End: 2022-11-14

## 2022-11-14 VITALS
TEMPERATURE: 97.9 F | HEART RATE: 80 BPM | DIASTOLIC BLOOD PRESSURE: 72 MMHG | BODY MASS INDEX: 19.46 KG/M2 | WEIGHT: 114 LBS | HEIGHT: 64 IN | SYSTOLIC BLOOD PRESSURE: 114 MMHG | RESPIRATION RATE: 17 BRPM

## 2022-11-14 DIAGNOSIS — K59.04 FUNCTIONAL CONSTIPATION: Primary | ICD-10-CM

## 2022-11-14 DIAGNOSIS — K59.00 OBSTIPATION: ICD-10-CM

## 2022-11-14 NOTE — PATIENT INSTRUCTIONS
Take Miralax 1 capful twice a day until you have a good bowel movement  Then take 1 capful of Miralax daily until you are moving your bowels more regularly  Once you are having more regular bowel movements you can stop the Miralax, then work to increase intake of fiber and hydration   Obstipation   AMBULATORY CARE:   Obstipation  develops when you become so constipated you cannot have a bowel movement  You may feel like you need to have a bowel movement  You may have abdominal or rectal pain  You may also have bloating, nausea, or vomiting  Seek care immediately if:   You have severe abdominal pain  You have a bloody or black bowel movement  Contact your healthcare provider if:   You have a fever and back, stomach, muscle, or joint pain  You vomit more than once  You are losing weight without trying  You have a change in the color, amount, size, or consistency of your bowel movement  You have questions or concerns about your condition or care  Treatment  may include any of the following:  Fiber supplements  help decrease constipation by adding water and bulk to your bowel movement  Laxatives  can help stimulate your bowels to have a bowel movement  You will only take laxatives for a short time  Long-term use may make your bowels dependent on the medicine  Manual removal  is a procedure to take out your impacted bowel movement  Your healthcare provider will use a gloved hand to remove the impaction  He or she will use lubricant to make the removal easier  Surgery  may rarely be needed to remove your impaction or to repair damage caused by your obstipation  Relieve obstipation:   A suppository  may be used to help soften your bowel movements  This may make them easier to pass  A suppository is guided into your rectum through your anus  An enema  is liquid medicine used to clear bowel movement from your rectum  The medicine is put into your rectum through your anus  Prevent obstipation:   Drink liquids as directed  Liquids will help keep your bowel movements soft so you pass them with less pain  Ask your healthcare provider how much liquid to drink each day and which liquids are best for you  Eat high-fiber foods or take fiber supplements  Fiber adds bulk to your bowel movement and makes it easier to pass  Raw fruits, fresh vegetables, whole grains, and beans are examples of high-fiber foods  Adults should eat at least 20 grams of fiber a day  Your healthcare provider or dietitian can help you plan meals  Make time for your bowel movements  You may develop constipation if you ignore the urge or wait too long  Help train your body to have regular bowel movements by setting a bathroom time each day  The best time is after a morning meal, because your colon prepares for a bowel movement when you eat  Exercise regularly  Exercise may help your intestines pass bowel movements more often  Ask your healthcare provider about the best exercise plan for you  Follow up with your healthcare provider as directed: Your healthcare provider may refer you to a gastroenterologist  Sherlyn Andrade a list of your questions so you remember to ask them during your visits  © Copyright [employer] 2022 Information is for End User's use only and may not be sold, redistributed or otherwise used for commercial purposes  All illustrations and images included in CareNotes® are the copyrighted property of [employer]  or [facility] Brittney Shaffer   The above information is an  only  It is not intended as medical advice for individual conditions or treatments  Talk to your doctor, nurse or pharmacist before following any medical regimen to see if it is safe and effective for you

## 2022-11-14 NOTE — PROGRESS NOTES
FAMILY MEDICINE PROGRESS NOTE    Date of Service: 22  Primary Care Provider:   Bigg Hercules MD       Name: Preston Maravilla       : 2000       Age:22 y o  Sex: female      MRN: [id number]      Chief Complaint:Constipation (Patient complained of abdominal cramping and hard stools, has not had a normal bowel movement in 3 weeks )       ASSESSMENT and PLAN:  Preston Maravilla is a 20 y o  female with:     Problem List Items Addressed This Visit    None     Visit Diagnoses     Functional constipation    -  Primary    Obstipation            Patient with several weeks of constipation  Recommended treating with Miralax, starting with two capfuls daily until have more regular bowel movements, then decreasing to one capful daily then stop  Once she is having more regular bowel function recommended that she work to increase intake of fiber in her diet by getting a variety of plant foods  SUBJECTIVE:  Preston Maravilla is a 20 y o  female who presents today with a chief complaint of Constipation (Patient complained of abdominal cramping and hard stools, has not had a normal bowel movement in 3 weeks )  HPI     She reports that she has had 3 weeks of irregular bowel habits  This started as constipation for 1 week, then 1 day of diarrhea about 10 days ago  She now has had constipation, only goes about every 3 to 4 days  Her last bowel movement was last night  She tried Miralax one time just over a week ago  She reports normal appetite, she has been trying to drink more water  She denies abdominal pain, but does have cramping  Review of Systems   Constitutional: Negative for activity change, appetite change, chills and fever  Gastrointestinal: Positive for constipation  Negative for abdominal distention, abdominal pain (cramping ) and blood in stool  I have reviewed the patient's Past Medical History      Current Outpatient Medications:   •  hydrOXYzine HCL (ATARAX) 50 mg tablet, Take 1 tablet (50 mg total) by mouth 3 (three) times a day as needed for itching (Patient not taking: No sig reported), Disp: 30 tablet, Rfl: 0    OBJECTIVE:  /72   Pulse 80   Temp 97 9 °F (36 6 °C)   Resp 17   Ht 5' 4 4" (1 636 m)   Wt 51 7 kg (114 lb)   LMP 11/06/2022 (Exact Date)   BMI 19 33 kg/m²    BP Readings from Last 3 Encounters:   11/14/22 114/72   03/18/22 108/64   04/07/21 98/66      Wt Readings from Last 3 Encounters:   11/14/22 51 7 kg (114 lb)   03/18/22 52 6 kg (116 lb)   04/07/21 52 8 kg (116 lb 8 oz)      Physical Exam  Constitutional:       General: She is not in acute distress  Appearance: Normal appearance  She is normal weight  She is not ill-appearing or toxic-appearing  HENT:      Head: Normocephalic and atraumatic  Right Ear: External ear normal       Left Ear: External ear normal       Nose: Nose normal       Mouth/Throat:      Mouth: Mucous membranes are moist    Eyes:      Extraocular Movements: Extraocular movements intact  Conjunctiva/sclera: Conjunctivae normal    Pulmonary:      Effort: Pulmonary effort is normal  No respiratory distress  Abdominal:      General: Bowel sounds are decreased  There is no distension  Palpations: Abdomen is soft  Tenderness: There is abdominal tenderness in the right lower quadrant  There is no guarding or rebound  Negative signs include Carrasquillo's sign and Rovsing's sign  Musculoskeletal:         General: Normal range of motion  Cervical back: Normal range of motion and neck supple  Skin:     Findings: No erythema or rash  Neurological:      General: No focal deficit present  Mental Status: She is alert and oriented to person, place, and time  Psychiatric:         Mood and Affect: Mood normal          Behavior: Behavior normal                 Return if symptoms worsen or fail to improve  Adela Kohler MD    Note: Portions of the record have been created with voice recognition software    Occasional wrong word or "sound a like" substitutions may have occurred due to the inherent limitations of voice recognition software  Read the chart carefully and recognize, using context, where substitutions have occurred

## 2022-12-30 ENCOUNTER — OFFICE VISIT (OUTPATIENT)
Dept: FAMILY MEDICINE CLINIC | Facility: CLINIC | Age: 22
End: 2022-12-30

## 2022-12-30 VITALS
SYSTOLIC BLOOD PRESSURE: 96 MMHG | WEIGHT: 112 LBS | HEIGHT: 64 IN | DIASTOLIC BLOOD PRESSURE: 64 MMHG | RESPIRATION RATE: 18 BRPM | BODY MASS INDEX: 19.12 KG/M2 | OXYGEN SATURATION: 98 % | HEART RATE: 72 BPM | TEMPERATURE: 98.2 F

## 2022-12-30 DIAGNOSIS — R23.3 EASY BRUISING: ICD-10-CM

## 2022-12-30 DIAGNOSIS — R04.0 RECURRENT EPISTAXIS: Primary | ICD-10-CM

## 2022-12-30 DIAGNOSIS — K06.8 BLEEDING GUMS: ICD-10-CM

## 2022-12-30 DIAGNOSIS — F41.1 GAD (GENERALIZED ANXIETY DISORDER): ICD-10-CM

## 2022-12-30 NOTE — PATIENT INSTRUCTIONS
Nosebleed   AMBULATORY CARE:   A nosebleed , or epistaxis, occurs when one or more of the blood vessels in your nose break  You may have dark or bright red blood from one or both nostrils  A nosebleed is most commonly caused by dry air or picking your nose  A direct blow to your nose, irritation from a cold or allergies, or a foreign object can also cause a nosebleed  Seek care immediately if:   Your nose is still bleeding after 20 minutes, even after you pinch it  Your nasal packing is soaked with blood  You have a foul-smelling discharge coming out of your nose  You feel so weak and dizzy that you have trouble standing up  You have trouble breathing or talking  Contact your healthcare provider if:   You have a fever and are vomiting  You have pain in and around your nose  Your nasal pack is loose  You have questions or concerns about your condition or care  First aid:   Sit up and lean forward  This will help prevent you from swallowing blood  Spit blood and saliva into a bowl  Apply pressure to your nose  Use 2 fingers to pinch your nose shut for 10 to 15 minutes  This will help stop the bleeding  Breathe through your mouth  Apply ice  on the bridge of your nose to decrease swelling and bleeding  Use a cold pack or put crushed ice in a plastic bag  Cover it with a towel to protect your skin  Pack your nose  with a cotton ball, tissue, tampon, or gauze bandage to stop the bleeding  Treatment for a severe nosebleed  may include any of the following if the bleeding does not stop after first aid is done:  Medicines  may be applied to a small piece of cotton and placed in your nose  Medicine may also be sprayed in or applied directly to your nose  You may need medicine to prevent an infection  If bleeding is severe, medicine may be injected into a blood vessel in your nose  Cautery  is when a chemical or electric device is used to seal the blood vessels   This may be done to stop bleeding or prevent more bleeding  Local anesthesia may be used  Nasal packing  is when layers of gauze are placed in your nose to provide pressure and stop the bleeding  Local anesthesia may be used  Nasal packing is usually removed in 2 to 3 days  Embolization  is a procedure used to stop the bleeding from inside your nose  Medical glue or a small balloon device may be used to clog the blood vessels in your nose  Surgery  may be needed if your nosebleed returns over and over long-term  An artery may be tied to stop bleeding  Damaged tissue or an abnormal structure in your nose may be repaired  Prevent another nosebleed:   Keep your nose moist   Put a small amount of petroleum jelly inside your nostrils as needed  Use a saline (saltwater) nasal spray  Do not put anything else inside your nose unless your healthcare provider says it is okay  Do not  use oil-based lubricants if you use oxygen therapy  They may be flammable  Use a cool mist humidifier to increase air moisture in your home  This will help your nose stay moist      Do not pick or blow your nose for at least a week  You can irritate or damage your nose if you pick it  Blowing your nose too hard may cause the bleeding to start again  Do not bend over or strain as this can cause the bleeding to start again  Avoid irritants  such as tobacco smoke or chemical sprays such as   Follow up with your healthcare provider as directed: Any packing in your nose should be removed within 2 to 3 days  Write down your questions so you remember to ask them during your visits  © [employer] 2022 Information is for End User's use only and may not be sold, redistributed or otherwise used for commercial purposes  All illustrations and images included in CareNotes® are the copyrighted property of [employer]  or Yonas Shaffer   The above information is an  only   It is not intended as medical advice for individual conditions or treatments  Talk to your doctor, nurse or pharmacist before following any medical regimen to see if it is safe and effective for you

## 2022-12-30 NOTE — PROGRESS NOTES
Name: Corinne Villagran      : 2000      MRN: [id number]  Encounter Provider: Antonella Benavidez MD  Encounter Date: 2022   Encounter department: [address] [place] [address] St     1  Recurrent epistaxis  -     CBC and differential; Future  -     Comprehensive metabolic panel; Future  -     Protime-INR; Future  -     APTT; Future  -     Epistaxis management  -     Ambulatory Referral to Otolaryngology; Future    2  Easy bruising  -     CBC and differential; Future  -     Protime-INR; Future  -     APTT; Future  -     VWF Activity; Future    3  Bleeding gums  -     CBC and differential; Future  -     Protime-INR; Future  -     APTT; Future  -     VWF Activity; Future    4  ROXANA (generalized anxiety disorder)  -     TSH, 3rd generation with Free T4 reflex; Future        Epistaxis management    Date/Time: 2022 11:58 AM  Performed by: Antonella Benavidez MD  Authorized by: Antonella Benavidez MD   Universal Protocol:  Consent given by: patient      Patient location:  Bedside  Procedure details:     Treatment site:  R anterior    Hemostasis method:  Silver nitrate    Approach:  External    Spec Headlamp used: Yes      Treatment complexity:  Extensive    Treatment episode: initial    Post-procedure details:     Assessment:  Bleeding decreased    Patient tolerance of procedure: Tolerated well, no immediate complications      Orders above  Cauterized with silver nitrate in office today  Referral made to ENT  Follow-up in 4 weeks to review blood work and reassess epistaxis    Subjective      Patient presents today reporting daily bloody noses  She is getting these every single day  She knows when they are coming on she feels pressure in her sinuses and above her eyes  Started in august every day  Nose bleed will last for 45 minutes  She does report easily bruises  Reports very heavy dark periods    Bleeds with brushing teeth     Moved into a house in August which is around when these nosebleeds started  House was built in 1910  Denies family history of bleeding disorders  However grandfather did have a history of bloody noses and had his nose cauterized  She is not currently on any medications  Review of Systems   HENT: Positive for nosebleeds  Gum bleeds    Hematological: Bruises/bleeds easily  Current Outpatient Medications on File Prior to Visit   Medication Sig   • [DISCONTINUED] hydrOXYzine HCL (ATARAX) 50 mg tablet Take 1 tablet (50 mg total) by mouth 3 (three) times a day as needed for itching (Patient not taking: Reported on 11/14/2022)       Objective     BP 96/64 (BP Location: Left arm, Patient Position: Sitting, Cuff Size: Standard)   Pulse 72   Temp 98 2 °F (36 8 °C)   Resp 18   Ht 5' 4" (1 626 m)   Wt 50 8 kg (112 lb)   LMP 12/05/2022 (Exact Date)   SpO2 98%   BMI 19 22 kg/m²     Physical Exam  Vitals and nursing note reviewed  Constitutional:       Appearance: Normal appearance  HENT:      Head: Normocephalic and atraumatic  Nose: Mucosal edema present  Left Nostril: Epistaxis present  Cardiovascular:      Rate and Rhythm: Normal rate and regular rhythm  Pulmonary:      Effort: Pulmonary effort is normal       Breath sounds: Normal breath sounds  Neurological:      General: No focal deficit present  Mental Status: She is alert and oriented to person, place, and time     Psychiatric:         Mood and Affect: Mood normal          Behavior: Behavior normal        Scott Betancur MD

## 2023-01-12 ENCOUNTER — APPOINTMENT (OUTPATIENT)
Dept: LAB | Facility: CLINIC | Age: 23
End: 2023-01-12

## 2023-01-12 DIAGNOSIS — F41.1 GAD (GENERALIZED ANXIETY DISORDER): ICD-10-CM

## 2023-01-12 DIAGNOSIS — R04.0 RECURRENT EPISTAXIS: ICD-10-CM

## 2023-01-12 DIAGNOSIS — R23.3 EASY BRUISING: ICD-10-CM

## 2023-01-12 DIAGNOSIS — K06.8 BLEEDING GUMS: ICD-10-CM

## 2023-01-12 LAB
ALBUMIN SERPL BCP-MCNC: 4.7 G/DL (ref 3.5–5)
ALP SERPL-CCNC: 55 U/L (ref 34–104)
ALT SERPL W P-5'-P-CCNC: 20 U/L (ref 7–52)
ANION GAP SERPL CALCULATED.3IONS-SCNC: 6 MMOL/L (ref 4–13)
APTT PPP: 31 SECONDS (ref 23–37)
AST SERPL W P-5'-P-CCNC: 19 U/L (ref 13–39)
BASOPHILS # BLD AUTO: 0.01 THOUSANDS/ÂΜL (ref 0–0.1)
BASOPHILS NFR BLD AUTO: 0 % (ref 0–1)
BILIRUB SERPL-MCNC: 0.48 MG/DL (ref 0.2–1)
BUN SERPL-MCNC: 17 MG/DL (ref 5–25)
CALCIUM SERPL-MCNC: 10 MG/DL (ref 8.4–10.2)
CHLORIDE SERPL-SCNC: 103 MMOL/L (ref 96–108)
CO2 SERPL-SCNC: 26 MMOL/L (ref 21–32)
CREAT SERPL-MCNC: 0.65 MG/DL (ref 0.6–1.3)
EOSINOPHIL # BLD AUTO: 0.04 THOUSAND/ÂΜL (ref 0–0.61)
EOSINOPHIL NFR BLD AUTO: 0 % (ref 0–6)
ERYTHROCYTE [DISTWIDTH] IN BLOOD BY AUTOMATED COUNT: 11.9 % (ref 11.6–15.1)
GFR SERPL CREATININE-BSD FRML MDRD: 125 ML/MIN/1.73SQ M
GLUCOSE P FAST SERPL-MCNC: 86 MG/DL (ref 65–99)
HCT VFR BLD AUTO: 41.9 % (ref 34.8–46.1)
HGB BLD-MCNC: 14 G/DL (ref 11.5–15.4)
IMM GRANULOCYTES # BLD AUTO: 0.01 THOUSAND/UL (ref 0–0.2)
IMM GRANULOCYTES NFR BLD AUTO: 0 % (ref 0–2)
INR PPP: 0.97 (ref 0.84–1.19)
LYMPHOCYTES # BLD AUTO: 1.4 THOUSANDS/ÂΜL (ref 0.6–4.47)
LYMPHOCYTES NFR BLD AUTO: 32 % (ref 14–44)
MCH RBC QN AUTO: 30.7 PG (ref 26.8–34.3)
MCHC RBC AUTO-ENTMCNC: 33.4 G/DL (ref 31.4–37.4)
MCV RBC AUTO: 92 FL (ref 82–98)
MONOCYTES # BLD AUTO: 0.43 THOUSAND/ÂΜL (ref 0.17–1.22)
MONOCYTES NFR BLD AUTO: 0 % (ref 4–12)
NEUTROPHILS # BLD AUTO: 2.43 THOUSANDS/ÂΜL (ref 1.85–7.62)
NEUTS SEG NFR BLD AUTO: 56 % (ref 43–75)
PLATELET # BLD AUTO: 195 THOUSANDS/UL (ref 149–390)
PMV BLD AUTO: 10.4 FL (ref 8.9–12.7)
POTASSIUM SERPL-SCNC: 3.8 MMOL/L (ref 3.5–5.3)
PROT SERPL-MCNC: 8.2 G/DL (ref 6.4–8.4)
PROTHROMBIN TIME: 13.1 SECONDS (ref 11.6–14.5)
RBC # BLD AUTO: 4.56 MILLION/UL (ref 3.81–5.12)
SODIUM SERPL-SCNC: 135 MMOL/L (ref 135–147)
TSH SERPL DL<=0.05 MIU/L-ACNC: 2.18 UIU/ML (ref 0.45–4.5)
WBC # BLD AUTO: 4.32 THOUSAND/UL (ref 4.31–10.16)

## 2023-01-14 LAB — VWF:RCO ACT/NOR PPP PL AGG: 44 % (ref 50–200)

## 2023-01-27 ENCOUNTER — OFFICE VISIT (OUTPATIENT)
Dept: FAMILY MEDICINE CLINIC | Facility: CLINIC | Age: 23
End: 2023-01-27

## 2023-01-27 VITALS
HEART RATE: 90 BPM | WEIGHT: 114.5 LBS | SYSTOLIC BLOOD PRESSURE: 110 MMHG | HEIGHT: 64 IN | DIASTOLIC BLOOD PRESSURE: 70 MMHG | OXYGEN SATURATION: 97 % | BODY MASS INDEX: 19.55 KG/M2 | TEMPERATURE: 97 F

## 2023-01-27 DIAGNOSIS — R04.0 RECURRENT EPISTAXIS: ICD-10-CM

## 2023-01-27 DIAGNOSIS — D68.00 DEFICIENCY OF VON WILLEBRAND FACTOR: Primary | ICD-10-CM

## 2023-01-27 DIAGNOSIS — Z30.011 OCP (ORAL CONTRACEPTIVE PILLS) INITIATION: ICD-10-CM

## 2023-01-27 LAB — SL AMB POCT URINE HCG: NEGATIVE

## 2023-01-27 RX ORDER — NORGESTIMATE AND ETHINYL ESTRADIOL 7DAYSX3 LO
1 KIT ORAL DAILY
Qty: 28 TABLET | Refills: 5 | Status: SHIPPED | OUTPATIENT
Start: 2023-01-27

## 2023-01-27 NOTE — PROGRESS NOTES
Name: Kim Perez      : 2000      MRN: [id number]  Encounter Provider: Cecilia Ferrer MD  Encounter Date: 2023   Encounter department: [facility]     1  Deficiency of von Willebrand factor  -     Ambulatory Referral to Hematology / Oncology; Future    2  OCP (oral contraceptive pills) initiation  -     norgestimate-ethinyl estradiol (Tri-Lo-Keiko) 0 18/0 215/0 25 MG-25 MCG per tablet; Take 1 tablet by mouth daily  -     POCT urine HCG    3  Recurrent epistaxis  -     Ambulatory Referral to Hematology / Oncology; Future    vWF 44%  Will refer to hematology for further evaluation in setting of recurrent epistaxis easy bruising and bleeding with dental procedures  She did have some improvement with epistaxis following silver nitrate cauterization  Patient will restart on her birth control  Follows with OB/GYN  Subjective      Patient presents today for follow-up with her mother  She completed ordered lab work  She is having recurrent epistaxis, easy bruising and bleeds with dental procedures  Does have very heavy periods  Is not currently on her birth control due to insurance  Review of Systems   Constitutional: Positive for fatigue  HENT: Positive for nosebleeds  Hematological: Bruises/bleeds easily  No current outpatient medications on file prior to visit  Objective     /70 (BP Location: Left arm, Patient Position: Sitting, Cuff Size: Standard)   Pulse 90   Temp (!) 97 °F (36 1 °C)   Ht 5' 4" (1 626 m)   Wt 51 9 kg (114 lb 8 oz)   SpO2 97%   BMI 19 65 kg/m²     Physical Exam  Vitals and nursing note reviewed  Constitutional:       Appearance: She is well-developed  HENT:      Head: Normocephalic and atraumatic  Nose:      Right Nostril: Epistaxis present  Cardiovascular:      Rate and Rhythm: Normal rate and regular rhythm     Pulmonary:      Effort: Pulmonary effort is normal       Breath sounds: Normal breath sounds  Skin:     Findings: Bruising present  Neurological:      General: No focal deficit present  Mental Status: She is alert     Psychiatric:         Mood and Affect: Mood normal          Behavior: Behavior normal        Rise MD Abiola

## 2023-01-30 ENCOUNTER — TELEPHONE (OUTPATIENT)
Dept: HEMATOLOGY ONCOLOGY | Facility: CLINIC | Age: 23
End: 2023-01-30

## 2023-02-06 ENCOUNTER — TELEPHONE (OUTPATIENT)
Dept: FAMILY MEDICINE CLINIC | Facility: CLINIC | Age: 23
End: 2023-02-06

## 2023-02-06 NOTE — TELEPHONE ENCOUNTER
Patient's mom called to report patient stopped birth control medication because it was causing increased anxiety  This is listed as possible side effect for medication  Mom said this was why patient stopped medication previously  Can a different medication be ordered without seeing patient?     [facility]

## 2023-02-21 ENCOUNTER — TELEPHONE (OUTPATIENT)
Dept: OBGYN CLINIC | Facility: OTHER | Age: 23
End: 2023-02-21

## 2023-02-21 NOTE — TELEPHONE ENCOUNTER
Appointment Confirmation (to confirm pre existing appointments - ONLY)  No need to route   Who is calling to confirm? Patient   If someone other than patient is calling, are they listed on the communication consent form? N/A   Appointment with  Dr Ksenia Pan   Appointment date & time  2/24/23 11:20am   Appointment location [facility]   Patient verbilized understanding Yes       Pt called to add insurance  Confirmed insurance info and added insurance to appt

## 2023-02-24 ENCOUNTER — CONSULT (OUTPATIENT)
Dept: HEMATOLOGY ONCOLOGY | Facility: CLINIC | Age: 23
End: 2023-02-24

## 2023-02-24 VITALS
DIASTOLIC BLOOD PRESSURE: 68 MMHG | HEART RATE: 89 BPM | OXYGEN SATURATION: 98 % | SYSTOLIC BLOOD PRESSURE: 102 MMHG | HEIGHT: 64 IN | BODY MASS INDEX: 19.46 KG/M2 | WEIGHT: 114 LBS | TEMPERATURE: 97.6 F

## 2023-02-24 DIAGNOSIS — D53.9 NUTRITIONAL ANEMIA: Primary | ICD-10-CM

## 2023-02-24 DIAGNOSIS — D68.00 DEFICIENCY OF VON WILLEBRAND FACTOR: ICD-10-CM

## 2023-02-24 DIAGNOSIS — R04.0 RECURRENT EPISTAXIS: ICD-10-CM

## 2023-02-24 RX ORDER — DESOGESTREL AND ETHINYL ESTRADIOL 0.15-0.03
KIT ORAL
COMMUNITY
Start: 2023-02-13

## 2023-02-27 ENCOUNTER — APPOINTMENT (OUTPATIENT)
Dept: LAB | Facility: CLINIC | Age: 23
End: 2023-02-27

## 2023-02-27 DIAGNOSIS — D68.00 DEFICIENCY OF VON WILLEBRAND FACTOR: ICD-10-CM

## 2023-02-27 DIAGNOSIS — D53.9 NUTRITIONAL ANEMIA: ICD-10-CM

## 2023-02-27 DIAGNOSIS — R04.0 RECURRENT EPISTAXIS: ICD-10-CM

## 2023-02-27 LAB
BASOPHILS # BLD AUTO: 0.03 THOUSANDS/ÂΜL (ref 0–0.1)
BASOPHILS NFR BLD AUTO: 1 % (ref 0–1)
EOSINOPHIL # BLD AUTO: 0.06 THOUSAND/ÂΜL (ref 0–0.61)
EOSINOPHIL NFR BLD AUTO: 2 % (ref 0–6)
ERYTHROCYTE [DISTWIDTH] IN BLOOD BY AUTOMATED COUNT: 11.7 % (ref 11.6–15.1)
FERRITIN SERPL-MCNC: 18 NG/ML (ref 8–388)
FOLATE SERPL-MCNC: 14.3 NG/ML (ref 3.1–17.5)
HCT VFR BLD AUTO: 44.4 % (ref 34.8–46.1)
HGB BLD-MCNC: 15 G/DL (ref 11.5–15.4)
IMM GRANULOCYTES # BLD AUTO: 0.01 THOUSAND/UL (ref 0–0.2)
IMM GRANULOCYTES NFR BLD AUTO: 0 % (ref 0–2)
IRON SATN MFR SERPL: 36 % (ref 15–50)
IRON SERPL-MCNC: 129 UG/DL (ref 50–170)
LYMPHOCYTES # BLD AUTO: 1.45 THOUSANDS/ÂΜL (ref 0.6–4.47)
LYMPHOCYTES NFR BLD AUTO: 38 % (ref 14–44)
MCH RBC QN AUTO: 31 PG (ref 26.8–34.3)
MCHC RBC AUTO-ENTMCNC: 33.8 G/DL (ref 31.4–37.4)
MCV RBC AUTO: 92 FL (ref 82–98)
MONOCYTES # BLD AUTO: 0.44 THOUSAND/ÂΜL (ref 0.17–1.22)
MONOCYTES NFR BLD AUTO: 12 % (ref 4–12)
NEUTROPHILS # BLD AUTO: 1.8 THOUSANDS/ÂΜL (ref 1.85–7.62)
NEUTS SEG NFR BLD AUTO: 47 % (ref 43–75)
NRBC BLD AUTO-RTO: 0 /100 WBCS
PLATELET # BLD AUTO: 166 THOUSANDS/UL (ref 149–390)
PMV BLD AUTO: 11.3 FL (ref 8.9–12.7)
RBC # BLD AUTO: 4.84 MILLION/UL (ref 3.81–5.12)
TIBC SERPL-MCNC: 356 UG/DL (ref 250–450)
VIT B12 SERPL-MCNC: 431 PG/ML (ref 100–900)
WBC # BLD AUTO: 3.79 THOUSAND/UL (ref 4.31–10.16)

## 2023-02-28 LAB — FACT XIIIA PPP-ACNC: 78 % (ref 56–140)

## 2023-03-01 LAB
VWF AG ACT/NOR PPP IA: 82 % (ref 50–200)
VWF:RCO ACT/NOR PPP PL AGG: 63 % (ref 50–200)

## 2023-03-03 NOTE — PROGRESS NOTES
Oncology Outpatient Consult Note  Carla Schmidt 24 y o  female MRN: @ Encounter: [id number]        Date:  3/3/2023        CC:  Evaluation for VWD      HPI:  Carla Schmidt is seen for initial consultation 3/3/2023 regarding her bleeding and easy bruising  She has been having nosebleed almost daily for the past 6 mos and at times they can last more than an hour  Her PCP cauterized her nasal mucosa and they have improved  She also bruises easily  If she gets a bug bite, just scrataching will cause bruising  Her periods have lasts for very long periods of time  She is just finishing a period that has gone on for 5 weeks  She was on estrogen containing OCPs for 3 years and stopped them 9 mos ago because they seemed to worsen depression and anxiety  Her bleeding symptoms worsened since stopping the OCPs  She has also noticed gum bleeding  When she cuts her skin, she will bleed for up to 2 days sometimes  She also has fatigue  Most recent labs show hgb = 14 in mid jan 2023 with an MCV = 90  PT = 13 1, PTT = 31   she doesn't take 3 excessive amounts of ibruprofen or ASA  No herbal medications  Her PCP sent a VWF activity that was 44% at the end of jan  She doesn't know of any history of bleeding disorders in the family, but her sister does get nosebleeds as well  She has never had dental extractions or tonsil removal   No tob  occ Etoh  She works as a   She is here today with her mother  ROS: As stated in history of present illness otherwise her 14 point review of systems today was negative  ECOG PS: 0    Test Results:    Imaging: No results found      Labs:   Lab Results   Component Value Date    WBC 3 79 (L) 02/27/2023    HGB 15 0 02/27/2023    HCT 44 4 02/27/2023    MCV 92 02/27/2023     02/27/2023     Lab Results   Component Value Date    K 3 8 01/12/2023     01/12/2023    CO2 26 01/12/2023    BUN 17 01/12/2023    CREATININE 0 65 01/12/2023    GLUF 86 01/12/2023 CALCIUM 10 0 01/12/2023    AST 19 01/12/2023    ALT 20 01/12/2023    ALKPHOS 55 01/12/2023    EGFR 125 01/12/2023         No results found for: SPEP, UPEP    No results found for: PSA    No results found for: CEA    No results found for: AFP    Lab Results   Component Value Date    IRON 129 02/27/2023    TIBC 356 02/27/2023    FERRITIN 18 02/27/2023       Lab Results   Component Value Date    RGETSMMZ09 431 02/27/2023               Active Problems:   Patient Active Problem List   Diagnosis   • Dyspnea   • ROXANA (generalized anxiety disorder)   • Moderate episode of recurrent major depressive disorder (HCC)   • Severe episode of recurrent major depressive disorder, without psychotic features ([address] )   • Bloody nose   • Easy bruising   • Bleeding gums       Past Medical History:   Past Medical History:   Diagnosis Date   • Hearing loss     Last assessed 2/17/2014       Surgical History: No past surgical history on file  Family History:    Family History   Problem Relation Age of Onset   • No Known Problems Mother    • Substance Abuse Father    • Mental illness Father        Cancer-related family history is not on file      Social History:   Social History     Socioeconomic History   • Marital status: Single     Spouse name: Not on file   • Number of children: Not on file   • Years of education: Not on file   • Highest education level: Some college, no degree   Occupational History   • Not on file   Tobacco Use   • Smoking status: Never   • Smokeless tobacco: Never   Substance and Sexual Activity   • Alcohol use: No   • Drug use: No   • Sexual activity: Yes     Partners: Male     Birth control/protection: OCP   Other Topics Concern   • Not on file   Social History Narrative   • Not on file     Social Determinants of Health     Financial Resource Strain: Not on file   Food Insecurity: Not on file   Transportation Needs: Not on file   Physical Activity: Not on file   Stress: Not on file   Social Connections: Not on file Intimate Partner Violence: Not on file   Housing Stability: Not on file       Current Medications:   Current Outpatient Medications   Medication Sig Dispense Refill   • Apri 0 15-30 MG-MCG per tablet      • norgestimate-ethinyl estradiol (Tri-Lo-Keiko) 0 18/0 215/0 25 MG-25 MCG per tablet Take 1 tablet by mouth daily (Patient not taking: Reported on 2/24/2023) 28 tablet 5     No current facility-administered medications for this visit  Allergies: No Known Allergies      Physical Exam:    Body surface area is 1 54 meters squared  Wt Readings from Last 3 Encounters:   02/24/23 51 7 kg (114 lb)   01/27/23 51 9 kg (114 lb 8 oz)   12/30/22 50 8 kg (112 lb)        Temp Readings from Last 3 Encounters:   02/24/23 97 6 °F (36 4 °C) (Temporal)   01/27/23 (!) 97 °F (36 1 °C)   12/30/22 98 2 °F (36 8 °C)        BP Readings from Last 3 Encounters:   02/24/23 102/68   01/27/23 110/70   12/30/22 96/64         Pulse Readings from Last 3 Encounters:   02/24/23 89   01/27/23 90   12/30/22 72     @LASTSAO2(3)@    Physical Exam     Constitutional   General appearance: No acute distress, well appearing and well nourished  Eyes   Conjunctiva and lids: No swelling, erythema or discharge  Pupils and irises: Equal, round and reactive to light  Ears, Nose, Mouth, and Throat   External inspection of ears and nose: Normal     Nasal mucosa, septum, and turbinates: Normal without edema or erythema  Oropharynx: Normal with no erythema, edema, exudate or lesions  Pulmonary   Respiratory effort: No increased work of breathing or signs of respiratory distress  Auscultation of lungs: Clear to auscultation  Cardiovascular   Palpation of heart: Normal PMI, no thrills  Auscultation of heart: Normal rate and rhythm, normal S1 and S2, without murmurs  Examination of extremities for edema and/or varicosities: Normal     Carotid pulses: Normal     Abdomen   Abdomen: Non-tender, no masses      Liver and spleen: No hepatomegaly or splenomegaly  Lymphatic   Palpation of lymph nodes in neck: No lymphadenopathy  Musculoskeletal   Gait and station: Normal     Digits and nails: Normal without clubbing or cyanosis  Inspection/palpation of joints, bones, and muscles: Normal     Skin   Skin and subcutaneous tissue: Normal without rashes or lesions  Neurologic   Cranial nerves: Cranial nerves 2-12 intact  Sensation: No sensory loss  Psychiatric   Orientation to person, place, and time: Normal     Mood and affect: Normal           Assessment/ Plan:  23 yo female with a bleeding history that certainly could be c/w VWD and a slightly low VWF activity level  I am going to repeat the full panel to assess what type it might be  I suspect that the estrogen containing OCPs that she took in the past elevated her VW levels and that is why she did not start having symptoms of bleeding and bruising until after she stopped them  Her PCP and gyn have recommended starting a different OCP but she did not start it yet due to concerns over side effects  I am glad she didn't so I can get accurate repeat testing  I will call her with the results of the VW panel  I am also going to check iron/folate/b12  Some of her fatigue may be due to anemia that has developed after prolonged menses  We will decide on follow up after I have these labs and we speak via telephone  I spent 45 minutes in chart review, face to face counseling, coordination of care, and documentation

## 2023-03-13 LAB — VWF MULTIMERS PPP IB: NORMAL

## 2023-03-14 ENCOUNTER — OFFICE VISIT (OUTPATIENT)
Dept: HEMATOLOGY ONCOLOGY | Facility: CLINIC | Age: 23
End: 2023-03-14

## 2023-03-14 ENCOUNTER — TELEPHONE (OUTPATIENT)
Dept: HEMATOLOGY ONCOLOGY | Facility: CLINIC | Age: 23
End: 2023-03-14

## 2023-03-14 VITALS
BODY MASS INDEX: 19.12 KG/M2 | HEART RATE: 93 BPM | WEIGHT: 112 LBS | DIASTOLIC BLOOD PRESSURE: 70 MMHG | TEMPERATURE: 98.1 F | OXYGEN SATURATION: 94 % | SYSTOLIC BLOOD PRESSURE: 110 MMHG | HEIGHT: 64 IN

## 2023-03-14 DIAGNOSIS — D50.0 IRON DEFICIENCY ANEMIA DUE TO CHRONIC BLOOD LOSS: Primary | ICD-10-CM

## 2023-03-14 DIAGNOSIS — D70.8 OTHER NEUTROPENIA (HCC): ICD-10-CM

## 2023-03-14 RX ORDER — FERROUS SULFATE TAB EC 324 MG (65 MG FE EQUIVALENT) 324 (65 FE) MG
324 TABLET DELAYED RESPONSE ORAL
Qty: 90 TABLET | Refills: 1 | Status: SHIPPED | OUTPATIENT
Start: 2023-03-14

## 2023-03-14 NOTE — TELEPHONE ENCOUNTER
Called to let patient know that I scheduled her follow up appointment for 6 moths which would be 09/15 @ 11

## 2023-03-16 NOTE — PROGRESS NOTES
HEMATOLOGY / ONCOLOGY CLINIC FOLLOW UP NOTE    Primary Care Provider: Rachel Ngo MD  Referring Provider:    MRN: [id number]  : 2000    Reason for Encounter:       Oncology History    No history exists  Interval History:         REVIEW OF SYSTEMS:  Please note that a 14-point review of systems was performed to include Constitutional, HEENT, Respiratory, CVS, GI, , Musculoskeletal, Integumentary, Neurologic, Rheumatologic, Endocrinologic, Psychiatric, Lymphatic, and Hematologic/Oncologic systems were reviewed and are negative unless otherwise stated in HPI  Positive and negative findings pertinent to this evaluation are incorporated into the history of present illness  ECOG PS: 0  PROBLEM LIST:  Patient Active Problem List   Diagnosis   • Dyspnea   • ROXANA (generalized anxiety disorder)   • Moderate episode of recurrent major depressive disorder (HCC)   • Severe episode of recurrent major depressive disorder, without psychotic features (HCC)   • Bloody nose   • Easy bruising   • Bleeding gums       Assessment / Plan:        I spent 30 minutes on chart review, face to face counseling time, coordination of care and documentation  Past Medical History:   has a past medical history of Hearing loss  PAST SURGICAL HISTORY:   has no past surgical history on file  CURRENT MEDICATIONS  Current Outpatient Medications   Medication Sig Dispense Refill   • Apri 0 15-30 MG-MCG per tablet      • ferrous sulfate 324 (65 Fe) mg Take 1 tablet (324 mg total) by mouth daily before breakfast 90 tablet 1   • norgestimate-ethinyl estradiol (Tri-Lo-Keiko) 0 18/0 215/0 25 MG-25 MCG per tablet Take 1 tablet by mouth daily (Patient not taking: Reported on 2023) 28 tablet 5     No current facility-administered medications for this visit  [unfilled]    SOCIAL HISTORY:   reports that she has never smoked   She has never used smokeless tobacco  She reports that she does not drink alcohol and does not use drugs      FAMILY HISTORY:  family history includes Mental illness in her father; No Known Problems in her mother; Substance Abuse in her father  ALLERGIES:  has No Known Allergies  Physical Exam:  Vital Signs:   Visit Vitals  /70 (BP Location: Left arm, Patient Position: Sitting, Cuff Size: Standard)   Pulse 93   Temp 98 1 °F (36 7 °C) (Temporal)   Ht 5' 4" (1 626 m)   Wt 50 8 kg (112 lb)   SpO2 94%   BMI 19 22 kg/m²   OB Status Unknown   Smoking Status Never   BSA 1 53 m²     Body mass index is 19 22 kg/m²  Body surface area is 1 53 meters squared  GEN: Alert, awake oriented x3, in no acute distress  HEENT- No pallor, icterus, cyanosis, no oral mucosal lesions,   LAD - no palpable cervical, clavicle, axillary, inguinal LAD  Heart- normal S1 S2, regular rate and rhythm, No murmur, rubs     Lungs- clear breathing sound bilateral    Abdomen- soft, Non tender, bowel sounds present  Extremities- No cyanosis, clubbing, edema  Neuro- No focal neurological deficit    Labs:  Lab Results   Component Value Date    WBC 3 79 (L) 02/27/2023    HGB 15 0 02/27/2023    HCT 44 4 02/27/2023    MCV 92 02/27/2023     02/27/2023     Lab Results   Component Value Date    SODIUM 135 01/12/2023    K 3 8 01/12/2023     01/12/2023    CO2 26 01/12/2023    AGAP 6 01/12/2023    BUN 17 01/12/2023    CREATININE 0 65 01/12/2023    GLUC 77 02/26/2019    GLUF 86 01/12/2023    CALCIUM 10 0 01/12/2023    AST 19 01/12/2023    ALT 20 01/12/2023    ALKPHOS 55 01/12/2023    TP 8 2 01/12/2023    TBILI 0 48 01/12/2023    EGFR 125 01/12/2023 mass index is 19 22 kg/m²  Body surface area is 1 53 meters squared  GEN: Alert, awake oriented x3, in no acute distress  HEENT- No pallor, icterus, cyanosis, no oral mucosal lesions,   LAD - no palpable cervical, clavicle, axillary, inguinal LAD  Heart- normal S1 S2, regular rate and rhythm, No murmur, rubs     Lungs- clear breathing sound bilateral    Abdomen- soft, Non tender, bowel sounds present  Extremities- No cyanosis, clubbing, edema  Neuro- No focal neurological deficit    Labs:  Lab Results   Component Value Date    WBC 3 79 (L) 02/27/2023    HGB 15 0 02/27/2023    HCT 44 4 02/27/2023    MCV 92 02/27/2023     02/27/2023     Lab Results   Component Value Date    SODIUM 135 01/12/2023    K 3 8 01/12/2023     01/12/2023    CO2 26 01/12/2023    AGAP 6 01/12/2023    BUN 17 01/12/2023    CREATININE 0 65 01/12/2023    GLUC 77 02/26/2019    GLUF 86 01/12/2023    CALCIUM 10 0 01/12/2023    AST 19 01/12/2023    ALT 20 01/12/2023    ALKPHOS 55 01/12/2023    TP 8 2 01/12/2023    TBILI 0 48 01/12/2023    EGFR 125 01/12/2023

## 2023-03-30 ENCOUNTER — TELEPHONE (OUTPATIENT)
Age: 23
End: 2023-03-30

## 2023-03-30 NOTE — TELEPHONE ENCOUNTER
Patient left a message on the voicemail requesting a call back regarding side effects she is having from the iron supplement  Attempted to call and had to leave a message  Left my teams number so she can call me directly

## 2023-03-30 NOTE — TELEPHONE ENCOUNTER
Spoke to patient  She said she came back from [place] last week  Since Wednesday of last week (3/22/23), she has been experiencing stomach cramping that comes and goes  This seems to be relieved when she eats something  No constipation or diarrhea  Also had one episodes of chills and cold sweats  Did not take her temperature but felt feverish  This has not happened again since last Wednesday  She started with little red patches on her face and her whole body is itchy since starting the iron  I did let her know that she should hold the iron for now, take benedryl for the itching and we will talk about IV iron with the provider  She mentioned that she is feeling dizziness that comes and goes (not positional) and this is associated with left sided chest pain that radiates to her back  She was not currently having this while on the phone  She said that her anxiety may be the cause of this  No SOB No heartburn, indigestion  I did let her know that if she were to experience this again, she should be evaluated in the ED to rule out a cardiac event  Patient verbalized understanding  Will discuss with provider and call patient back

## 2023-04-03 NOTE — TELEPHONE ENCOUNTER
Called patient to follow up on her symptoms since holding PO iron  She said all of her symptoms have resolved

## 2023-08-29 ENCOUNTER — TELEPHONE (OUTPATIENT)
Dept: HEMATOLOGY ONCOLOGY | Facility: CLINIC | Age: 23
End: 2023-08-29

## 2023-08-29 NOTE — TELEPHONE ENCOUNTER
Left VM for patient to let her know that her appt has been changed due to provider availability. I notified her of her new appt date and time and left the [facility] number for her to callback if changes need to be made.

## 2023-11-16 ENCOUNTER — OFFICE VISIT (OUTPATIENT)
Dept: GASTROENTEROLOGY | Facility: CLINIC | Age: 23
End: 2023-11-16

## 2023-11-16 VITALS
BODY MASS INDEX: 19.09 KG/M2 | SYSTOLIC BLOOD PRESSURE: 104 MMHG | TEMPERATURE: 98.1 F | DIASTOLIC BLOOD PRESSURE: 70 MMHG | HEART RATE: 85 BPM | WEIGHT: 111.8 LBS | OXYGEN SATURATION: 98 % | HEIGHT: 64 IN

## 2023-11-16 DIAGNOSIS — R10.84 GENERALIZED ABDOMINAL PAIN: Primary | ICD-10-CM

## 2023-11-16 DIAGNOSIS — K59.00 CONSTIPATION, UNSPECIFIED CONSTIPATION TYPE: ICD-10-CM

## 2023-11-16 NOTE — PATIENT INSTRUCTIONS
Woodrow Webber  11/16/2023     Recommended Total Fiber Intake**    AGE  MEN  WOMAN    19-50  38 grams/day  25 grams/day    Over 50  30 grams/day  21 grams/day    Fiber Sources in Common Foods   Use this guide to find out if you have enough fiber in you diet.     Food  Size of Serving  Fiber Grams/Servings  Calories/   Serving  Food  Size of Serving  [facility]   Serving    Fruits: (raw unless otherwise noted  Vegetables: (cooked, unless otherwise noted)    Apple (w/peel)  1 medium  3.7  81  Artichoke  1 globe  6.5  60    Apricots  1 cup  3.7  74  Asparagus  ½ cup  1.8  25    Banana  1 medium  2.7  105  Beans:    Blackberries  1 cup  7.2  75  Green (canned)  ½ cup  1.3  14    Blueberries  1 cup  3.9  81  Kidney  ½ cup  5.7  114    Cantaloupe  1 cup  1.3  56  Lima  ½ cup  6.1  85    Grapefruit  1 medium  2.8  82  Young  ½ cup  7.4  118    Grapes  1 cup  1.6  114  White  ½ cup  5.5  122    Orange  1 medium  3.1  62  Beets  ½ cup  1.6  37    Pear (with peel)  1 medium  4.0  98  Broccoli  ½ cup  2.8  26    Pineapple  1 cup  1.9  76  Cabbage, green  ½ cup  2.1  16    Plums  1 medium  1.0  36  Cabbage, green (raw)  ½ cup  0.8  9    Prunes (dried)  1 cup  11.4  386  Carrots  ½ cup  2.6  35    Raspberries  1 cup  8.4  60  Cauliflower  ½ cup  2.0  17    Strawberries  1 cup  3.4  45  Cauliflower (raw)  ½ cup  1.3  13    Watermelon  1 slice  0.8  51  Celery (raw)  ½ cup  1.0  10    GRAIN PRODUCTS AND OTHERS:  Corn  ½ cup  2.0  66    Bread:  Cucumber (raw)  ½ cup  0.4  7    Portuguese  1 slice  0.8  68  Eggplant  ½ cup  1.2  13    Rye  1 slice  1.6  67  Green Peas  ½ cup  4.4  62    White  1 slice  0.6  67  Lettuce, iceberg (raw)  ½ cup  0.4  4    Whole Wheat  1 slice  2.0  70  Onions (raw)  ½ cup  1.4  30    Cereal:  Potato (baked with skin)  ½ cup  1.5  66    Bran  1 ounce  9.7  70  Spinach  ½ cup  2.7  25    Corn Flakes  1 ounce  1.0  110  Tomato  ½ cup  1.0  19    Oat Bran  1 ounce  4.3  69  Zucchini  ½ cup  1.3  14    Oatmeal  1 ounce  3.0  109  METAMUCIL:    Shredded Wheat  1 ounce  2.8  102  Capsules  6 capsules  3.0  10    Crackers:  Smooth Texture Orange (sugar free)  1 tsp  3.0  20    Fabian  1 square  0.1  27  Smooth Texture Orange (with sugar)  1 tbsp  3.0  45    Saltine  1 regular  0.1  13  Wafers  2 wafers  3.0  120    Rice:    Brown  ½ cup  1.8  108    White  ½ cup  0.3  103    Spaghetti  2 ounces  2.1  225    Almonds (roasted)  ½ cup  6.4  351    Peanuts (roasted)  ½ cup  6.1  388      ** [employer], [facility], 2002   Track your fiber intake for five days. Use the Fiber Source Guide to find out how much fiber is in common food. If you’re not getting your recommended amount of fiber each day, talk to your doctor about how you can increase the fiber in your diet. Example  Monday Tuesday Wednesday Thursday Friday    Food  Oatmeal    Fiber Grams  2.8    Food  Blueberries    Fiber Grams  3.9    Food  W.W. Bread    Fiber Grams  1.9    Food  W.W. Bread    Fiber Grams  1.9    Food  Apple    Fiber Grams  3.7    Food  Spaghetti    Fiber Grams  . 14    Food  Corn    Fiber Grams  2.0    Food  White Bread    Fiber Grams  . 6    Food    Fiber Grams    Food    Fiber Grams    Food    Fiber Grams    Food    Fiber Grams    Food    Fiber Grams    Food    Fiber Grams    Food    Fiber Grams    Food    Fiber Grams    Food    Fiber Grams    Food    Fiber Grams    Food    Fiber Grams    Food    Fiber Grams    Add numbers in each column to find your daily fiber intake. Total Daily Fiber Intake  18.2      Too Low - Like most Americans, this example is not enough fiber. Talk to your doctor about how to add fiber to your diet. Quick Fiber Facts    Most Americans consume only about half of the recommended fiber they need each day. Fiber helps maintain normal bowel function, and helps prevent constipation and its potential complications.  Straining and pressure from constipation may lead to diverticular disease and hemorrhoids. Stool softeners or stimulant laxatives only offer short-term relief of constipation, while dietary changes or fiber therapies help break the cycle of irregularity. Diets low in saturated fat and cholesterol that include 7 grams of soluble fiber per day from psyllium husk, as in Metamucil, may reduce the risk of heart disease by lowering cholesterol. One adult dose of Metamucil has 2.4 grams of this soluable fiber. Increase fiber intake gradually, giving the body time to adjust.       High Fiber Diet   WHAT YOU NEED TO KNOW:   What is a high-fiber diet? A high-fiber diet includes foods that have a high amount of fiber. Fiber is the part of fruits, vegetables, and grains that is not broken down by your body. Fiber keeps your bowel movements regular. Fiber can also help lower your cholesterol level, control blood sugar in people with diabetes, and relieve constipation. Fiber can also help you control your weight because it helps you feel full faster. Most adults should eat 25 to 35 grams of fiber each day. Talk to your dietitian or healthcare provider about the amount of fiber you need. What foods are good sources of fiber?        Foods with at least 4 grams of fiber per serving:      ? to ½ cup of high-fiber cereal (check the nutrition label on the box)    ½ cup of blackberries or raspberries    4 dried prunes    1 cooked artichoke    ½ cup of cooked legumes, such as lentils, or red, kidney, and day beans    Foods with 1 to 3 grams of fiber per servin slice of whole-wheat, pumpernickel, or rye bread    ½ cup of cooked brown rice    4 whole-wheat crackers    1 cup of oatmeal    ½ cup of cereal with 1 to 3 grams of fiber per serving (check the nutrition label on the box)    1 small piece of fruit, such as an apple, banana, pear, kiwi, or orange    3 dates    ½ cup of canned apricots, fruit cocktail, peaches, or pears    ½ cup of raw or cooked vegetables, such as carrots, cauliflower, cabbage, spinach, squash, or corn  What are some ways that I can increase fiber in my diet? Choose brown or wild rice instead of white rice. Use whole wheat flour in recipes instead of white or all-purpose flour. Add beans and peas to casseroles or soups. Choose fresh fruit and vegetables with peels or skins on instead of juices. What other guidelines should I follow? Add fiber to your diet slowly. You may have abdominal discomfort, bloating, and gas if you add fiber to your diet too quickly. Drink plenty of liquids as you add fiber to your diet. You may have nausea or develop constipation if you do not drink enough water. Ask how much liquid to drink each day and which liquids are best for you. CARE AGREEMENT:   You have the right to help plan your care. Discuss treatment options with your healthcare provider to decide what care you want to receive. You always have the right to refuse treatment. The above information is an  only. It is not intended as medical advice for individual conditions or treatments. Talk to your doctor, nurse or pharmacist before following any medical regimen to see if it is safe and effective for you. © Copyright Roly Feldman 2023 Information is for End User's use only and may not be sold, redistributed or otherwise used for commercial purposes.

## 2023-11-16 NOTE — PROGRESS NOTES
[facility] - Outpatient Consultation New Patient  Ernestina Valdez 24 y.o. female MRN: [id number]  Encounter: [id number]          ASSESSMENT AND PLAN:      1. Generalized abdominal pain  2. Constipation, unspecified  Patient reports near constant abdominal pain, usually right periumbilical but at times left-sided. She also reports a history of constipation, moving her bowels on average once per week and alternating between PATIENTS [facility] stool #1 or #5 stools. Abdominal pain is worse when it has been several days without a bowel movement. Currently using MiraLAX sporadically as needed which works well.  -Follow-up pelvic ultrasound as ordered by gynecology.  -Check TSH and celiac panel.  -Discussed with patient that abdominal pain is likely related to stool burden/trapped gas as a result of her chronic constipation. I suspect intermittent loose stools are due to overflow diarrhea.  -Continue with excellent water intake, > 64 ounces water daily. Aim for at least 20 to 25 g of fiber daily, provided written materials at checkout. Advised patient to monitor for any trigger foods that may worsen her symptoms.  -Start MiraLAX on a regular basis, adjust dose and frequency as indicated to keep bowels regular. May take 1/2 to 1 dose every day or every other day. -If abdominal pain and constipation are not resolved with more regular use of MiraLAX, will consider medications such as Linzess or Amitiza.  -Advised patient to call the office or send a [employer] message if any new/worsening symptoms or any questions/concerns. She has no further questions at this time. Follow-up 3 months.    ________________________________________________________    HPI:  Ernestina Valdez is a 79-year-old female with pertinent PMH including easy bleeding/bruising with concern for VW, depression, anxiety who presents for abdominal pain.     Patient reports onset of right periumbilical pain about 1 year ago that has been constant but is at its worst when laying on her back. Then in March 2023, had an episode of sharp/cramping left-sided abdominal pain that lasted about 1 hour and then resolved. Left-sided pain has recently come back. She saw her gynecologist yesterday who ordered a repeat pelvic ultrasound which is scheduled in December. She denies any history of prior abdominal surgeries. Associated symptoms include bloating and chronic constipation with intermittent loose stools. She will go 7 days with no bowel movement then will have either Kimble stool #1 or #5, nothing in between. Abdominal pain is worse when it has been several days without a bowel movement. She currently uses MiraLAX as needed, which works well and does not cause any significant side effects other than loose stools if she takes it over a prolonged period of time. She denies any rectal bleeding and has no unintentional weight loss or weight gain. She drinks at least 64 ounces of water daily and eats lots of salads and veggies. Labs 2/27/2023 reviewed. CBC normal other than white count 3.79. Iron panel with ferritin 18. Normal folate and B12. Labs 1/12/2023 reviewed. CMP normal.  TSH normal.    Pelvic ultrasound 3/3/2023 with no acute findings, multiple prominent follicles seen in ovaries.     Answers submitted by the patient for this visit:  Abdominal Pain Questionnaire (Submitted on 11/16/2023)  Chief Complaint: Abdominal pain  Chronicity: recurrent  Onset: more than 1 year ago  Onset quality: sudden  Frequency: daily  Episode duration: 12 Months  Progression since onset: waxing and waning  Pain location: LLQ, periumbilical region, left flank  Pain - numeric: 4/10  Pain quality: aching, cramping  Radiates to: pelvis  anorexia: No  arthralgias: No  belching: No  constipation: Yes  diarrhea: Yes  dysuria: No  fever: No  flatus: Yes  frequency: No  headaches: Yes  hematochezia: No  hematuria: No  melena: No  myalgias: No  nausea: Yes  weight loss: No  vomiting: No  Aggravated by: being still, certain positions, eating  Relieved by: nothing  Diagnostic workup: ultrasound        REVIEW OF SYSTEMS:  10 point ROS reviewed and negative, except as above    Historical Information   Past Medical History:   Diagnosis Date    Hearing loss     Last assessed 2/17/2014     No past surgical history on file. Social History   Social History     Substance and Sexual Activity   Alcohol Use No     Social History     Substance and Sexual Activity   Drug Use No     Social History     Tobacco Use   Smoking Status Never   Smokeless Tobacco Never     Family History   Problem Relation Age of Onset    No Known Problems Mother     Substance Abuse Father     Mental illness Father        Meds/Allergies       Current Outpatient Medications:     Apri 0.15-30 MG-MCG per tablet    ferrous sulfate 324 (65 Fe) mg    norgestimate-ethinyl estradiol (Tri-Lo-Keiko) 0.18/0.215/0.25 MG-25 MCG per tablet    No Known Allergies        Objective   Wt Readings from Last 3 Encounters:   03/14/23 50.8 kg (112 lb)   02/24/23 51.7 kg (114 lb)   01/27/23 51.9 kg (114 lb 8 oz)     Temp Readings from Last 3 Encounters:   03/14/23 98.1 °F (36.7 °C) (Temporal)   02/24/23 97.6 °F (36.4 °C) (Temporal)   01/27/23 (!) 97 °F (36.1 °C)     BP Readings from Last 3 Encounters:   03/14/23 110/70   02/24/23 102/68   01/27/23 110/70     Pulse Readings from Last 3 Encounters:   03/14/23 93   02/24/23 89   01/27/23 90        PHYSICAL EXAM:     Physical Exam  Vitals reviewed. Constitutional:       General: She is not in acute distress. Appearance: She is well-developed. HENT:      Head: Normocephalic and atraumatic. Eyes:      Conjunctiva/sclera: Conjunctivae normal.   Cardiovascular:      Rate and Rhythm: Normal rate and regular rhythm. Heart sounds: No murmur heard. Pulmonary:      Effort: Pulmonary effort is normal. No respiratory distress. Breath sounds: Normal breath sounds.    Abdominal: General: Bowel sounds are normal. There is no distension. Palpations: Abdomen is soft. Tenderness: There is no abdominal tenderness. There is no guarding. Musculoskeletal:         General: No swelling. Cervical back: Neck supple. Skin:     General: Skin is warm and dry. Neurological:      Mental Status: She is alert. Psychiatric:         Mood and Affect: Mood normal.           Lab Results:   No visits with results within 1 Day(s) from this visit.    Latest known visit with results is:   Appointment on 02/27/2023   Component Date Value    WBC 02/27/2023 3.79 (L)     RBC 02/27/2023 4.84     Hemoglobin 02/27/2023 15.0     Hematocrit 02/27/2023 44.4     MCV 02/27/2023 92     MCH 02/27/2023 31.0     MCHC 02/27/2023 33.8     RDW 02/27/2023 11.7     MPV 02/27/2023 11.3     Platelets 97/38/4720 166     nRBC 02/27/2023 0     Neutrophils Relative 02/27/2023 47     Immat GRANS % 02/27/2023 0     Lymphocytes Relative 02/27/2023 38     Monocytes Relative 02/27/2023 12     Eosinophils Relative 02/27/2023 2     Basophils Relative 02/27/2023 1     Neutrophils Absolute 02/27/2023 1.80 (L)     Immature Grans Absolute 02/27/2023 0.01     Lymphocytes Absolute 02/27/2023 1.45     Monocytes Absolute 02/27/2023 0.44     Eosinophils Absolute 02/27/2023 0.06     Basophils Absolute 02/27/2023 0.03     Folate 02/27/2023 14.3     Vitamin B-12 02/27/2023 431     Von Willebrand Factor 02/27/2023 63     Factor VIII Activity 02/27/2023 78     Von Willebrand Ag 02/27/2023 82     VWF MULTIMERS 02/27/2023 Comment     Iron Saturation 02/27/2023 36     TIBC 02/27/2023 356     Iron 02/27/2023 129     Ferritin 02/27/2023 18        Lab Results   Component Value Date    WBC 3.79 (L) 02/27/2023    HGB 15.0 02/27/2023    HCT 44.4 02/27/2023    MCV 92 02/27/2023     02/27/2023       Lab Results   Component Value Date    SODIUM 135 01/12/2023    K 3.8 01/12/2023     01/12/2023    CO2 26 01/12/2023    AGAP 6 01/12/2023    BUN 17 01/12/2023    CREATININE 0.65 01/12/2023    GLUC 77 02/26/2019    GLUF 86 01/12/2023    CALCIUM 10.0 01/12/2023    AST 19 01/12/2023    ALT 20 01/12/2023    ALKPHOS 55 01/12/2023    TP 8.2 01/12/2023    TBILI 0.48 01/12/2023    EGFR 125 01/12/2023         Radiology Results:   No results found.     Prior Procedure Results/Reports   No prior EGD/colonoscopy      Zohra Carrasco PA-C   Available on [facility]

## 2023-12-04 ENCOUNTER — APPOINTMENT (OUTPATIENT)
Dept: LAB | Facility: CLINIC | Age: 23
End: 2023-12-04
Payer: COMMERCIAL

## 2023-12-04 DIAGNOSIS — R10.84 GENERALIZED ABDOMINAL PAIN: ICD-10-CM

## 2023-12-04 DIAGNOSIS — D70.8 OTHER NEUTROPENIA (HCC): ICD-10-CM

## 2023-12-04 DIAGNOSIS — D50.0 IRON DEFICIENCY ANEMIA DUE TO CHRONIC BLOOD LOSS: ICD-10-CM

## 2023-12-04 DIAGNOSIS — K59.00 CONSTIPATION, UNSPECIFIED CONSTIPATION TYPE: ICD-10-CM

## 2023-12-04 LAB
BASOPHILS # BLD AUTO: 0.03 THOUSANDS/ÂΜL (ref 0–0.1)
BASOPHILS NFR BLD AUTO: 1 % (ref 0–1)
CRP SERPL QL: <1 MG/L
EOSINOPHIL # BLD AUTO: 0.05 THOUSAND/ÂΜL (ref 0–0.61)
EOSINOPHIL NFR BLD AUTO: 1 % (ref 0–6)
ERYTHROCYTE [DISTWIDTH] IN BLOOD BY AUTOMATED COUNT: 12 % (ref 11.6–15.1)
ERYTHROCYTE [SEDIMENTATION RATE] IN BLOOD: 17 MM/HOUR (ref 0–19)
FERRITIN SERPL-MCNC: 19 NG/ML (ref 11–307)
HCT VFR BLD AUTO: 43.1 % (ref 34.8–46.1)
HGB BLD-MCNC: 14.2 G/DL (ref 11.5–15.4)
HIV 1+2 AB+HIV1 P24 AG SERPL QL IA: NORMAL
HIV 2 AB SERPL QL IA: NORMAL
HIV1 AB SERPL QL IA: NORMAL
HIV1 P24 AG SERPL QL IA: NORMAL
IGA SERPL-MCNC: 301 MG/DL (ref 66–433)
IMM GRANULOCYTES # BLD AUTO: 0 THOUSAND/UL (ref 0–0.2)
IMM GRANULOCYTES NFR BLD AUTO: 0 % (ref 0–2)
IRON SATN MFR SERPL: 28 % (ref 15–50)
IRON SERPL-MCNC: 91 UG/DL (ref 50–212)
LYMPHOCYTES # BLD AUTO: 1.45 THOUSANDS/ÂΜL (ref 0.6–4.47)
LYMPHOCYTES NFR BLD AUTO: 39 % (ref 14–44)
MCH RBC QN AUTO: 30.9 PG (ref 26.8–34.3)
MCHC RBC AUTO-ENTMCNC: 32.9 G/DL (ref 31.4–37.4)
MCV RBC AUTO: 94 FL (ref 82–98)
MONOCYTES # BLD AUTO: 0.48 THOUSAND/ÂΜL (ref 0.17–1.22)
MONOCYTES NFR BLD AUTO: 13 % (ref 4–12)
NEUTROPHILS # BLD AUTO: 1.75 THOUSANDS/ÂΜL (ref 1.85–7.62)
NEUTS SEG NFR BLD AUTO: 46 % (ref 43–75)
NRBC BLD AUTO-RTO: 0 /100 WBCS
PLATELET # BLD AUTO: 193 THOUSANDS/UL (ref 149–390)
PMV BLD AUTO: 10.6 FL (ref 8.9–12.7)
RBC # BLD AUTO: 4.6 MILLION/UL (ref 3.81–5.12)
TIBC SERPL-MCNC: 321 UG/DL (ref 250–450)
TSH SERPL DL<=0.05 MIU/L-ACNC: 2.03 UIU/ML (ref 0.45–4.5)
UIBC SERPL-MCNC: 230 UG/DL (ref 155–355)
WBC # BLD AUTO: 3.76 THOUSAND/UL (ref 4.31–10.16)

## 2023-12-04 PROCEDURE — 82784 ASSAY IGA/IGD/IGG/IGM EACH: CPT

## 2023-12-04 PROCEDURE — 86803 HEPATITIS C AB TEST: CPT

## 2023-12-04 PROCEDURE — 83550 IRON BINDING TEST: CPT

## 2023-12-04 PROCEDURE — 83918 ORGANIC ACIDS TOTAL QUANT: CPT

## 2023-12-04 PROCEDURE — 85025 COMPLETE CBC W/AUTO DIFF WBC: CPT

## 2023-12-04 PROCEDURE — 86364 TISS TRNSGLTMNASE EA IG CLAS: CPT

## 2023-12-04 PROCEDURE — 36415 COLL VENOUS BLD VENIPUNCTURE: CPT

## 2023-12-04 PROCEDURE — 86140 C-REACTIVE PROTEIN: CPT

## 2023-12-04 PROCEDURE — 82728 ASSAY OF FERRITIN: CPT

## 2023-12-04 PROCEDURE — 87389 HIV-1 AG W/HIV-1&-2 AB AG IA: CPT

## 2023-12-04 PROCEDURE — 83540 ASSAY OF IRON: CPT

## 2023-12-04 PROCEDURE — 85652 RBC SED RATE AUTOMATED: CPT

## 2023-12-04 PROCEDURE — 84443 ASSAY THYROID STIM HORMONE: CPT

## 2023-12-05 LAB
HCV AB S/CO SERPL IA: NON REACTIVE
SL AMB INTERPRETATION: NORMAL
TTG IGA SER-ACNC: <2 U/ML (ref 0–3)

## 2023-12-08 ENCOUNTER — TELEPHONE (OUTPATIENT)
Age: 23
End: 2023-12-08

## 2023-12-08 ENCOUNTER — OFFICE VISIT (OUTPATIENT)
Dept: FAMILY MEDICINE CLINIC | Facility: CLINIC | Age: 23
End: 2023-12-08
Payer: COMMERCIAL

## 2023-12-08 ENCOUNTER — PREP FOR PROCEDURE (OUTPATIENT)
Dept: GASTROENTEROLOGY | Facility: CLINIC | Age: 23
End: 2023-12-08

## 2023-12-08 VITALS
SYSTOLIC BLOOD PRESSURE: 108 MMHG | TEMPERATURE: 97.6 F | OXYGEN SATURATION: 94 % | BODY MASS INDEX: 19.55 KG/M2 | RESPIRATION RATE: 16 BRPM | WEIGHT: 114.5 LBS | HEIGHT: 64 IN | DIASTOLIC BLOOD PRESSURE: 66 MMHG | HEART RATE: 77 BPM

## 2023-12-08 DIAGNOSIS — R10.30 LOWER ABDOMINAL PAIN: Primary | ICD-10-CM

## 2023-12-08 DIAGNOSIS — R10.84 GENERALIZED ABDOMINAL PAIN: ICD-10-CM

## 2023-12-08 DIAGNOSIS — F33.2 SEVERE EPISODE OF RECURRENT MAJOR DEPRESSIVE DISORDER, WITHOUT PSYCHOTIC FEATURES (HCC): ICD-10-CM

## 2023-12-08 PROCEDURE — 99214 OFFICE O/P EST MOD 30 MIN: CPT | Performed by: FAMILY MEDICINE

## 2023-12-08 RX ORDER — DICYCLOMINE HYDROCHLORIDE 10 MG/1
10 CAPSULE ORAL
Qty: 30 CAPSULE | Refills: 1 | Status: SHIPPED | OUTPATIENT
Start: 2023-12-08

## 2023-12-08 NOTE — PROGRESS NOTES
Name: Teddy Dubois      : 2000      MRN: [id number]  Encounter Provider: Jolane Ahumada, MD  Encounter Date: 2023   Encounter department: [address] [place] [address]     1. Lower abdominal pain  -     dicyclomine (BENTYL) 10 mg capsule; Take 1 capsule (10 mg total) by mouth 4 (four) times a day (before meals and at bedtime)  -     CT abdomen pelvis w contrast; Future; Expected date: 2023    2. Severe episode of recurrent major depressive disorder, without psychotic features ([address])      Ongoing generalized abdominal pain and tenderness in the left lower quadrant. Continues to have pain despite stool softener. Will obtain CT of the abdomen pelvis with contrast.  Trial patient on Bentyl as needed for abdominal cramping. I do recommend patient follow-up with gastroenterology and keep a food log. I have spent a total time of 30 minutes on 23 in caring for this patient including Prognosis, Risks and benefits of tx options, Instructions for management, Patient and family education, Risk factor reductions, Impressions, Counseling / Coordination of care, Documenting in the medical record, Reviewing / ordering tests, medicine, procedures  , and Obtaining or reviewing history  . Subjective            Started MiraLAX daily as prescribed by gastroenterology and was having bowel movements daily but continued to have pain.  us scheduled   Constant cramping both sides of belly. Feels like gas is building up in her stomach. Tried gas-ex, ducolax and miralax. Denies any blood in her stool. Scared to eat over cramping. She can now goto the bathroom but continues to have pain. Over the weekend she was at her grandmother's house. Following a meal she was hunched over in pain. She contemplated going to the emergency department. This has been an ongoing issue and she seems frustrated that no one has come up with an answer.       GI Problem  The primary symptoms include abdominal pain and diarrhea. Primary symptoms do not include vomiting. The illness is also significant for constipation. Review of Systems   Gastrointestinal:  Positive for abdominal pain, constipation and diarrhea. Negative for vomiting. Abdominal cramping       Current Outpatient Medications on File Prior to Visit   Medication Sig   • Apri 0.15-30 MG-MCG per tablet  (Patient not taking: Reported on 11/16/2023)   • ferrous sulfate 324 (65 Fe) mg Take 1 tablet (324 mg total) by mouth daily before breakfast (Patient not taking: Reported on 11/16/2023)   • norgestimate-ethinyl estradiol (Tri-Lo-Keiko) 0.18/0.215/0.25 MG-25 MCG per tablet Take 1 tablet by mouth daily (Patient not taking: Reported on 2/24/2023)       Objective     /66 (BP Location: Left arm, Patient Position: Sitting, Cuff Size: Standard)   Pulse 77   Temp 97.6 °F (36.4 °C)   Resp 16   Ht 5' 4" (1.626 m)   Wt 51.9 kg (114 lb 8 oz)   SpO2 94%   BMI 19.65 kg/m²     Physical Exam  Vitals and nursing note reviewed. Constitutional:       Appearance: She is well-developed. HENT:      Head: Normocephalic and atraumatic. Cardiovascular:      Rate and Rhythm: Normal rate and regular rhythm. Pulmonary:      Effort: Pulmonary effort is normal.      Breath sounds: Normal breath sounds. Abdominal:      General: Bowel sounds are normal.      Tenderness: There is abdominal tenderness. There is guarding. Neurological:      General: No focal deficit present. Mental Status: She is alert.    Psychiatric:         Mood and Affect: Mood normal.         Behavior: Behavior normal.       Binh Mast MD

## 2023-12-08 NOTE — TELEPHONE ENCOUNTER
Called patient back. She saw her PCP today and was advised to arrange a colonoscopy given she has had ongoing abdominal pain. She has been using MiraLAX as advised with improvement in her bowel habits, however abdominal pain has remained unchanged. She had an episode of severe lower abdominal pain and bloating after eating dinner at her grandmother's house over the weekend, which was described as intense gas pains. We will schedule a colonoscopy for further evaluation of her abdominal pain. I also advised patient she may continue taking MiraLAX at her current dosing to keep bowels regular. Patient verbalized understanding and agrees with plan of care as outlined above.

## 2023-12-08 NOTE — TELEPHONE ENCOUNTER
Patients GI provider:  AVERY Corona    Number to return call: [phone]    Reason for call: Pt calling to speak directly to [place]. Pt stated that she is taking the Miralax daily as suggested but she is still having abd pain. Her PCP recommended for her to have a colonoscopy but she wanted to speak to AVERY Corona first. Above is her call back number.      Scheduled procedure/appointment date if applicable: Appt  0/84/18

## 2023-12-09 LAB — METHYLMALONATE SERPL-SCNC: 118 NMOL/L (ref 0–378)

## 2023-12-21 ENCOUNTER — HOSPITAL ENCOUNTER (OUTPATIENT)
Dept: CT IMAGING | Facility: HOSPITAL | Age: 23
Discharge: HOME | End: 2023-12-21
Attending: FAMILY MEDICINE
Payer: COMMERCIAL

## 2023-12-21 DIAGNOSIS — R10.30 LOWER ABDOMINAL PAIN: ICD-10-CM

## 2023-12-21 PROCEDURE — 74177 CT ABD & PELVIS W/CONTRAST: CPT

## 2023-12-21 RX ADMIN — IOHEXOL 70 ML: 350 INJECTION, SOLUTION INTRAVENOUS at 15:38

## 2024-02-15 ENCOUNTER — TELEPHONE (OUTPATIENT)
Age: 24
End: 2024-02-15

## 2024-06-08 ENCOUNTER — HOSPITAL ENCOUNTER (EMERGENCY)
Facility: HOSPITAL | Age: 24
Discharge: HOME | End: 2024-06-08
Attending: EMERGENCY MEDICINE
Payer: COMMERCIAL

## 2024-06-08 ENCOUNTER — APPOINTMENT (EMERGENCY)
Dept: CT IMAGING | Facility: HOSPITAL | Age: 24
End: 2024-06-08
Payer: COMMERCIAL

## 2024-06-08 VITALS
RESPIRATION RATE: 16 BRPM | OXYGEN SATURATION: 100 % | DIASTOLIC BLOOD PRESSURE: 81 MMHG | SYSTOLIC BLOOD PRESSURE: 120 MMHG | TEMPERATURE: 98.3 F | HEART RATE: 106 BPM

## 2024-06-08 DIAGNOSIS — E86.0 DEHYDRATION: ICD-10-CM

## 2024-06-08 DIAGNOSIS — R10.9 ABDOMINAL PAIN: ICD-10-CM

## 2024-06-08 DIAGNOSIS — K29.70 GASTRITIS: Primary | ICD-10-CM

## 2024-06-08 LAB
ALBUMIN SERPL BCP-MCNC: 4.6 G/DL (ref 3.5–5)
ALP SERPL-CCNC: 54 U/L (ref 34–104)
ALT SERPL W P-5'-P-CCNC: 8 U/L (ref 7–52)
ANION GAP SERPL CALCULATED.3IONS-SCNC: 6 MMOL/L (ref 4–13)
AST SERPL W P-5'-P-CCNC: 16 U/L (ref 13–39)
BASOPHILS # BLD AUTO: 0.02 THOUSANDS/ÂΜL (ref 0–0.1)
BASOPHILS NFR BLD AUTO: 0 % (ref 0–1)
BILIRUB SERPL-MCNC: 0.39 MG/DL (ref 0.2–1)
BILIRUB UR QL STRIP: NEGATIVE
BUN SERPL-MCNC: 21 MG/DL (ref 5–25)
CALCIUM SERPL-MCNC: 9.7 MG/DL (ref 8.4–10.2)
CHLORIDE SERPL-SCNC: 105 MMOL/L (ref 96–108)
CLARITY UR: CLEAR
CO2 SERPL-SCNC: 25 MMOL/L (ref 21–32)
COLOR UR: COLORLESS
CREAT SERPL-MCNC: 0.77 MG/DL (ref 0.6–1.3)
EOSINOPHIL # BLD AUTO: 0.04 THOUSAND/ÂΜL (ref 0–0.61)
EOSINOPHIL NFR BLD AUTO: 1 % (ref 0–6)
ERYTHROCYTE [DISTWIDTH] IN BLOOD BY AUTOMATED COUNT: 11.6 % (ref 11.6–15.1)
EXT PREGNANCY TEST URINE: NEGATIVE
EXT. CONTROL: NORMAL
GFR SERPL CREATININE-BSD FRML MDRD: 107 ML/MIN/1.73SQ M
GLUCOSE SERPL-MCNC: 84 MG/DL (ref 65–140)
GLUCOSE UR STRIP-MCNC: NEGATIVE MG/DL
HCT VFR BLD AUTO: 43.2 % (ref 34.8–46.1)
HGB BLD-MCNC: 14.6 G/DL (ref 11.5–15.4)
HGB UR QL STRIP.AUTO: NEGATIVE
IMM GRANULOCYTES # BLD AUTO: 0.02 THOUSAND/UL (ref 0–0.2)
IMM GRANULOCYTES NFR BLD AUTO: 0 % (ref 0–2)
KETONES UR STRIP-MCNC: NEGATIVE MG/DL
LEUKOCYTE ESTERASE UR QL STRIP: NEGATIVE
LIPASE SERPL-CCNC: 61 U/L (ref 11–82)
LYMPHOCYTES # BLD AUTO: 1.25 THOUSANDS/ÂΜL (ref 0.6–4.47)
LYMPHOCYTES NFR BLD AUTO: 19 % (ref 14–44)
MCH RBC QN AUTO: 30.9 PG (ref 26.8–34.3)
MCHC RBC AUTO-ENTMCNC: 33.8 G/DL (ref 31.4–37.4)
MCV RBC AUTO: 92 FL (ref 82–98)
MONOCYTES # BLD AUTO: 0.58 THOUSAND/ÂΜL (ref 0.17–1.22)
MONOCYTES NFR BLD AUTO: 9 % (ref 4–12)
NEUTROPHILS # BLD AUTO: 4.84 THOUSANDS/ÂΜL (ref 1.85–7.62)
NEUTS SEG NFR BLD AUTO: 71 % (ref 43–75)
NITRITE UR QL STRIP: NEGATIVE
NRBC BLD AUTO-RTO: 0 /100 WBCS
PH UR STRIP.AUTO: 6.5 [PH]
PLATELET # BLD AUTO: 181 THOUSANDS/UL (ref 149–390)
PMV BLD AUTO: 11.3 FL (ref 8.9–12.7)
POTASSIUM SERPL-SCNC: 3.8 MMOL/L (ref 3.5–5.3)
PROT SERPL-MCNC: 8.3 G/DL (ref 6.4–8.4)
PROT UR STRIP-MCNC: NEGATIVE MG/DL
RBC # BLD AUTO: 4.72 MILLION/UL (ref 3.81–5.12)
SODIUM SERPL-SCNC: 136 MMOL/L (ref 135–147)
SP GR UR STRIP.AUTO: 1.01 (ref 1–1.03)
UROBILINOGEN UR STRIP-ACNC: <2 MG/DL
WBC # BLD AUTO: 6.75 THOUSAND/UL (ref 4.31–10.16)

## 2024-06-08 PROCEDURE — 81025 URINE PREGNANCY TEST: CPT

## 2024-06-08 PROCEDURE — 36415 COLL VENOUS BLD VENIPUNCTURE: CPT

## 2024-06-08 PROCEDURE — 85025 COMPLETE CBC W/AUTO DIFF WBC: CPT | Performed by: EMERGENCY MEDICINE

## 2024-06-08 PROCEDURE — 96375 TX/PRO/DX INJ NEW DRUG ADDON: CPT

## 2024-06-08 PROCEDURE — 96365 THER/PROPH/DIAG IV INF INIT: CPT

## 2024-06-08 PROCEDURE — 96366 THER/PROPH/DIAG IV INF ADDON: CPT

## 2024-06-08 PROCEDURE — 81003 URINALYSIS AUTO W/O SCOPE: CPT | Performed by: EMERGENCY MEDICINE

## 2024-06-08 PROCEDURE — 99284 EMERGENCY DEPT VISIT MOD MDM: CPT

## 2024-06-08 PROCEDURE — 83690 ASSAY OF LIPASE: CPT | Performed by: EMERGENCY MEDICINE

## 2024-06-08 PROCEDURE — 99285 EMERGENCY DEPT VISIT HI MDM: CPT | Performed by: EMERGENCY MEDICINE

## 2024-06-08 PROCEDURE — 74177 CT ABD & PELVIS W/CONTRAST: CPT

## 2024-06-08 PROCEDURE — 80053 COMPREHEN METABOLIC PANEL: CPT | Performed by: EMERGENCY MEDICINE

## 2024-06-08 RX ORDER — FAMOTIDINE 40 MG/1
40 TABLET, FILM COATED ORAL
Qty: 20 TABLET | Refills: 0 | Status: SHIPPED | OUTPATIENT
Start: 2024-06-08 | End: 2024-06-14 | Stop reason: SDUPTHER

## 2024-06-08 RX ORDER — SUCRALFATE 1 G/1
1 TABLET ORAL 4 TIMES DAILY PRN
Qty: 28 TABLET | Refills: 0 | Status: SHIPPED | OUTPATIENT
Start: 2024-06-08 | End: 2024-06-14 | Stop reason: ALTCHOICE

## 2024-06-08 RX ORDER — FAMOTIDINE 10 MG/ML
20 INJECTION, SOLUTION INTRAVENOUS ONCE
Status: COMPLETED | OUTPATIENT
Start: 2024-06-08 | End: 2024-06-08

## 2024-06-08 RX ORDER — SUCRALFATE 1 G/1
1 TABLET ORAL 4 TIMES DAILY PRN
Qty: 28 TABLET | Refills: 0 | Status: SHIPPED | OUTPATIENT
Start: 2024-06-08 | End: 2024-06-08

## 2024-06-08 RX ORDER — MAGNESIUM HYDROXIDE/ALUMINUM HYDROXICE/SIMETHICONE 120; 1200; 1200 MG/30ML; MG/30ML; MG/30ML
30 SUSPENSION ORAL ONCE
Status: COMPLETED | OUTPATIENT
Start: 2024-06-08 | End: 2024-06-08

## 2024-06-08 RX ORDER — LIDOCAINE HYDROCHLORIDE 20 MG/ML
15 SOLUTION OROPHARYNGEAL ONCE
Status: COMPLETED | OUTPATIENT
Start: 2024-06-08 | End: 2024-06-08

## 2024-06-08 RX ORDER — FAMOTIDINE 40 MG/1
40 TABLET, FILM COATED ORAL
Qty: 20 TABLET | Refills: 0 | Status: SHIPPED | OUTPATIENT
Start: 2024-06-08 | End: 2024-06-08

## 2024-06-08 RX ADMIN — SODIUM CHLORIDE, SODIUM LACTATE, POTASSIUM CHLORIDE, AND CALCIUM CHLORIDE 1000 ML: .6; .31; .03; .02 INJECTION, SOLUTION INTRAVENOUS at 07:39

## 2024-06-08 RX ADMIN — FAMOTIDINE 20 MG: 10 INJECTION INTRAVENOUS at 09:46

## 2024-06-08 RX ADMIN — LIDOCAINE HYDROCHLORIDE 15 ML: 20 SOLUTION ORAL; TOPICAL at 09:46

## 2024-06-08 RX ADMIN — IOHEXOL 70 ML: 350 INJECTION, SOLUTION INTRAVENOUS at 08:36

## 2024-06-08 RX ADMIN — ALUMINUM HYDROXIDE, MAGNESIUM HYDROXIDE, DIMETHICONE 30 ML: 200; 20; 200 SUSPENSION ORAL at 09:46

## 2024-06-08 NOTE — ED PROVIDER NOTES
History  Chief Complaint   Patient presents with    Abdominal Pain     Pt reports mid abd pain that started 3 days ago and now started to experience upper back pain as well. Pt reports n/d and reports no urinary symptoms. Was seen at urgent care yesterday and was dx with gasteritis and sent home with only nausea medication     Woodrow is a 24 yof with history of GERD who presents with abdominal pain. Had onset of intermittent cramping, mid-epigastric pain and periumbilical pain at least 3-4 days ago, along with nausea and poor appetite.  Had 1 loose, watery, non-bloody stool daily for the first two days of this illness, no bowel movements in the past two days.  Denies vomiting but has had very minimal PO intake.  Was seen at urgent care yesterday, dx gastroenteritis, sent home with zofran. Last night, pain became sharp, constant, worse with eating, not relieved by zofran, accompanied by pain between the shoulder blades which is not worse with movement and cannot be palpated, pain currently 3/10.  Denies fever, chills, weakness, URI symptoms, chest pain, dyspnea, vomiting, flank pain, dysuria, hematuria, peripheral edema, or unilateral calf pain or swelling.  No personal or family history of IBD, however mom and grandmother have PUD. Does not drink alcohol, does not use recreational drugs, no excessive caffeine intake, denies possibility of pregnancy or STI.          Prior to Admission Medications   Prescriptions Last Dose Informant Patient Reported? Taking?   Apri 0.15-30 MG-MCG per tablet  Self Yes No   Patient not taking: Reported on 11/16/2023   dicyclomine (BENTYL) 10 mg capsule   No No   Sig: Take 1 capsule (10 mg total) by mouth 4 (four) times a day (before meals and at bedtime)   ferrous sulfate 324 (65 Fe) mg  Self No No   Sig: Take 1 tablet (324 mg total) by mouth daily before breakfast   Patient not taking: Reported on 11/16/2023   norgestimate-ethinyl estradiol (Tri-Lo-Keiko) 0.18/0.215/0.25 MG-25 MCG per  tablet  Self No No   Sig: Take 1 tablet by mouth daily   Patient not taking: Reported on 2/24/2023      Facility-Administered Medications: None       Past Medical History:   Diagnosis Date    Anxiety     Clotting disorder (HCC)     Hearing loss     Last assessed 2/17/2014    Severe episode of recurrent major depressive disorder, without psychotic features ([facility]) 09/25/2020       History reviewed. No pertinent surgical history.    Family History   Problem Relation Age of Onset    No Known Problems Mother     Substance Abuse Father     Mental illness Father      I have reviewed and agree with the history as documented.    E-Cigarette/Vaping    E-Cigarette Use Never User      E-Cigarette/Vaping Substances     Social History     Tobacco Use    Smoking status: Never    Smokeless tobacco: Never   Vaping Use    Vaping status: Never Used   Substance Use Topics    Alcohol use: No    Drug use: Never        Review of Systems   Constitutional:  Positive for appetite change and chills. Negative for diaphoresis, fatigue, fever and unexpected weight change.   HENT: Negative.  Negative for congestion, rhinorrhea, sneezing and sore throat.    Eyes: Negative.  Negative for visual disturbance.   Respiratory: Negative.  Negative for cough and shortness of breath.    Cardiovascular: Negative.  Negative for chest pain, palpitations and leg swelling.   Gastrointestinal:  Positive for abdominal pain, diarrhea and nausea. Negative for abdominal distention, anal bleeding, blood in stool, constipation and vomiting.   Endocrine: Negative.    Genitourinary: Negative.  Negative for decreased urine volume, difficulty urinating, dysuria, flank pain, frequency, hematuria, pelvic pain, urgency, vaginal bleeding, vaginal discharge and vaginal pain.   Musculoskeletal:  Positive for back pain. Negative for gait problem, neck pain and neck stiffness.   Skin: Negative.  Negative for color change, pallor and rash.   Allergic/Immunologic: Negative.     Neurological: Negative.  Negative for dizziness, weakness, light-headedness, numbness and headaches.   Hematological: Negative.  Negative for adenopathy.   Psychiatric/Behavioral: Negative.     All other systems reviewed and are negative.      Physical Exam  ED Triage Vitals [06/08/24 0644]   Temperature Pulse Respirations Blood Pressure SpO2   98.3 °F (36.8 °C) 92 17 125/71 99 %      Temp Source Heart Rate Source Patient Position - Orthostatic VS BP Location FiO2 (%)   Oral Monitor Lying Right arm --      Pain Score       3             Orthostatic Vital Signs  Vitals:    06/08/24 0644 06/08/24 0949   BP: 125/71 120/81   Pulse: 92 (!) 106   Patient Position - Orthostatic VS: Lying Sitting       Physical Exam  Vitals and nursing note reviewed. Exam conducted with a chaperone present.   Constitutional:       General: She is not in acute distress.     Appearance: Normal appearance. She is well-developed. She is not diaphoretic.   HENT:      Head: Normocephalic.      Nose: Nose normal.      Mouth/Throat:      Pharynx: Oropharynx is clear. No pharyngeal swelling or oropharyngeal exudate.      Comments: Tacky oral mucosa.  Eyes:      General: No scleral icterus.     Extraocular Movements: Extraocular movements intact.      Conjunctiva/sclera: Conjunctivae normal.   Cardiovascular:      Rate and Rhythm: Normal rate and regular rhythm.      Pulses: Normal pulses.      Heart sounds: Normal heart sounds.   Pulmonary:      Effort: Pulmonary effort is normal. No respiratory distress.      Breath sounds: Normal breath sounds.   Chest:      Chest wall: No tenderness.   Abdominal:      General: Abdomen is flat. Bowel sounds are normal. There is no distension.      Palpations: Abdomen is soft.      Tenderness: There is abdominal tenderness in the periumbilical area. There is no right CVA tenderness, left CVA tenderness, guarding or rebound. Negative signs include Carrasquillo's sign, Rovsing's sign and McBurney's sign.      Comments:  Tenderness to palpation just to the right of the umbilicus.   Musculoskeletal:         General: Normal range of motion.      Cervical back: Normal range of motion and neck supple.      Right lower leg: No edema.      Left lower leg: No edema.   Lymphadenopathy:      Cervical: No cervical adenopathy.   Skin:     General: Skin is warm and dry.      Capillary Refill: Capillary refill takes less than 2 seconds.      Coloration: Skin is not jaundiced or pale.      Findings: No bruising or rash.   Neurological:      General: No focal deficit present.      Mental Status: She is alert and oriented to person, place, and time.         ED Medications  Medications   lactated ringers bolus 1,000 mL (0 mL Intravenous Stopped 6/8/24 0946)   iohexol (OMNIPAQUE) 350 MG/ML injection (MULTI-DOSE) 70 mL (70 mL Intravenous Given 6/8/24 0836)   aluminum-magnesium hydroxide-simethicone (MAALOX) oral suspension 30 mL (30 mL Oral Given 6/8/24 0946)   Lidocaine Viscous HCl (XYLOCAINE) 2 % mucosal solution 15 mL (15 mL Swish & Spit Given 6/8/24 0946)   Famotidine (PF) (PEPCID) injection 20 mg (20 mg Intravenous Given 6/8/24 0946)       Diagnostic Studies  Results Reviewed       Procedure Component Value Units Date/Time    UA w Reflex to Microscopic w Reflex to Culture [[id number]] Collected: 06/08/24 0822    Lab Status: Final result Specimen: Urine, Clean Catch Updated: 06/08/24 0831     Color, UA Colorless     Clarity, UA Clear     Specific Gravity, UA 1.014     pH, UA 6.5     Leukocytes, UA Negative     Nitrite, UA Negative     Protein, UA Negative mg/dl      Glucose, UA Negative mg/dl      Ketones, UA Negative mg/dl      Urobilinogen, UA <2.0 mg/dl      Bilirubin, UA Negative     Occult Blood, UA Negative    POCT pregnancy, urine [[id number]]  (Normal) Resulted: 06/08/24 0822    Lab Status: Final result Updated: 06/08/24 0823     EXT Preg Test, Ur Negative     Control Valid    Comprehensive metabolic panel [[id number]] Collected: 06/08/24  0648    Lab Status: Final result Specimen: Blood from Arm, Left Updated: 06/08/24 0713     Sodium 136 mmol/L      Potassium 3.8 mmol/L      Chloride 105 mmol/L      CO2 25 mmol/L      ANION GAP 6 mmol/L      BUN 21 mg/dL      Creatinine 0.77 mg/dL      Glucose 84 mg/dL      Calcium 9.7 mg/dL      AST 16 U/L      ALT 8 U/L      Alkaline Phosphatase 54 U/L      Total Protein 8.3 g/dL      Albumin 4.6 g/dL      Total Bilirubin 0.39 mg/dL      eGFR 107 ml/min/1.73sq m     Narrative:      National Kidney Disease Foundation guidelines for Chronic Kidney Disease (CKD):     Stage 1 with normal or high GFR (GFR > 90 mL/min/1.73 square meters)    Stage 2 Mild CKD (GFR = 60-89 mL/min/1.73 square meters)    Stage 3A Moderate CKD (GFR = 45-59 mL/min/1.73 square meters)    Stage 3B Moderate CKD (GFR = 30-44 mL/min/1.73 square meters)    Stage 4 Severe CKD (GFR = 15-29 mL/min/1.73 square meters)    Stage 5 End Stage CKD (GFR <15 mL/min/1.73 square meters)  Note: GFR calculation is accurate only with a steady state creatinine    Lipase [[id number]]  (Normal) Collected: 06/08/24 0648    Lab Status: Final result Specimen: Blood from Arm, Left Updated: 06/08/24 0713     Lipase 61 u/L     CBC and differential [[id number]] Collected: 06/08/24 0648    Lab Status: Final result Specimen: Blood from Arm, Left Updated: 06/08/24 0657     WBC 6.75 Thousand/uL      RBC 4.72 Million/uL      Hemoglobin 14.6 g/dL      Hematocrit 43.2 %      MCV 92 fL      MCH 30.9 pg      MCHC 33.8 g/dL      RDW 11.6 %      MPV 11.3 fL      Platelets 181 Thousands/uL      nRBC 0 /100 WBCs      Segmented % 71 %      Immature Grans % 0 %      Lymphocytes % 19 %      Monocytes % 9 %      Eosinophils Relative 1 %      Basophils Relative 0 %      Absolute Neutrophils 4.84 Thousands/µL      Absolute Immature Grans 0.02 Thousand/uL      Absolute Lymphocytes 1.25 Thousands/µL      Absolute Monocytes 0.58 Thousand/µL      Eosinophils Absolute 0.04 Thousand/µL      Basophils  Absolute 0.02 Thousands/µL                    CT abdomen pelvis with contrast   Final Result by Dawson Zuniga MD (06/08 0918)      No acute findings in the abdomen or pelvis.         Workstation performed: [id number]               Procedures  Procedures      ED Course  ED Course as of 06/08/24 1111   Sat Jun 08, 2024   0710 CBC and differential  No leukocytosis, H&H within normal limits.   0711 Patient is a 24-year-old female with history of GERD who presents with right periumbilical abdominal pain for at least the past several days.  Pain initially intermittent and mild cramping, now is persistent and sharp, also feels pain between her shoulder blades.  Had watery, nonbloody diarrhea at onset of illness, is persistently nauseated but has not vomited.  Has no appetite and has not been able to eat much for the past several days.  No urinary symptoms.  Afebrile.  Could represent gastritis versus duodenitis vs gastroenteritis, however pancreatitis, colitis, diverticulitis, less likely cholecystitis or appendicitis cannot be excluded.  Doubt  pathology.  Will obtain labs and imaging to evaluate further.  Will also give fluid bolus as patient appears dehydrated. Declines pain and nausea medications at this time.  If workup unremarkable, plan for GI cocktail.   0714 LIPASE: 61  Doubt pancreatitis.   0715 Comprehensive metabolic panel  No electrolyte abnormalities, no evidence of renal or hepatic dysfunction.   0826 PREGNANCY TEST URINE: Negative   0831 UA w Reflex to Microscopic w Reflex to Culture  Normal.  No evidence of UTI.   0922 CT abdomen pelvis with contrast  IMPRESSION:     No acute findings in the abdomen or pelvis.        0922 Labs and imaging normal.  Likely gastritis vs gastroenteritis.  Will give GI cocktail, reassess.   1000 Symptoms completely resolved after GI cocktail and pepcid.  Likely gastritis vs PUD.  Will d/c with pepcid, carafate, and maalox.  Will place GI referral.  Return  precautions and supportive care measures discussed.                              SBIRT 22yo+      Flowsheet Row Most Recent Value   Initial Alcohol Screen: US AUDIT-C     1. How often do you have a drink containing alcohol? 0 Filed at: 06/08/2024 0645   2. How many drinks containing alcohol do you have on a typical day you are drinking?  0 Filed at: 06/08/2024 0645   3a. Male UNDER 65: How often do you have five or more drinks on one occasion? 0 Filed at: 06/08/2024 0645   3b. FEMALE Any Age, or MALE 65+: How often do you have 4 or more drinks on one occassion? 0 Filed at: 06/08/2024 0645   Audit-C Score 0 Filed at: 06/08/2024 0645   MICHAEL: How many times in the past year have you...    Used an illegal drug or used a prescription medication for non-medical reasons? Never Filed at: 06/08/2024 0645                  Medical Decision Making  See ED course for MDM.    Amount and/or Complexity of Data Reviewed  External Data Reviewed: notes.  Labs: ordered. Decision-making details documented in ED Course.  Radiology: ordered. Decision-making details documented in ED Course.    Risk  OTC drugs.  Prescription drug management.          Disposition  Final diagnoses:   Abdominal pain   Gastritis   Dehydration     Time reflects when diagnosis was documented in both MDM as applicable and the Disposition within this note       Time User Action Codes Description Comment    6/8/2024  9:49 AM Melita Leung Add [K29.70] Gastritis     6/8/2024  9:49 AM Melita Leung Add [R10.9] Abdominal pain     6/8/2024  9:49 AM Melita Leung Modify [R10.9] Abdominal pain     6/8/2024  9:49 AM Melita Leung Remove [K29.70] Gastritis     6/8/2024  9:49 AM Melita Leung Add [K52.9] Gastroenteritis     6/8/2024  9:49 AM Melita Leung Modify [R10.9] Abdominal pain     6/8/2024  9:49 AM Melita Leung Modify [K52.9] Gastroenteritis     6/8/2024  9:50 AM Melita Leung Modify [R10.9] Abdominal pain     6/8/2024  9:50 AM Melita Leung Remove  [K52.9] Gastroenteritis     6/8/2024  9:50 AM Melita Leung Add [K29.70] Gastritis     6/8/2024  9:50 AM Melita Leung Modify [R10.9] Abdominal pain     6/8/2024  9:50 AM Melita Leung Modify [K29.70] Gastritis     6/8/2024  9:52 AM Melita Leung Add [E86.0] Dehydration           ED Disposition       ED Disposition   Discharge    Condition   Stable    Date/Time   Sat Jun 8, 2024 1001    Comment   Woodrow Webber discharge to home/self care.                   Follow-up Information       Follow up With Specialties Details Why Contact Info Additional Information    Carol Mayo MD Family Medicine   [address] [place] [id number]  [phone]       [facility] Schedule an appointment as soon as possible for a visit   [address] [facility]  [address]  [facility] [address] [address] [phone] [facility] [address] [place] [address], [address], [place], [place], [address] [phone]            Discharge Medication List as of 6/8/2024 10:02 AM        START taking these medications    Details   aluminum-magnesium hydroxide 200-200 MG/5ML suspension Take 15 mL by mouth every 6 (six) hours as needed for heartburn, indigestion or cramping, Starting Sat 6/8/2024, Normal      famotidine (PEPCID) 40 MG tablet Take 1 tablet (40 mg total) by mouth daily at bedtime as needed for heartburn, Starting Sat 6/8/2024, Normal      sucralfate (CARAFATE) 1 g tablet Take 1 tablet (1 g total) by mouth 4 (four) times a day as needed (burning pain in stomach) for up to 7 days, Starting Sat 6/8/2024, Until Sat 6/15/2024 at 2359, Normal           CONTINUE these medications which have NOT CHANGED    Details   Apri 0.15-30 MG-MCG per tablet Starting Mon 2/13/2023, Historical Med      dicyclomine (BENTYL) 10 mg capsule Take 1 capsule (10 mg total) by mouth 4 (four) times a day (before meals and at bedtime), Starting Fri 12/8/2023, Normal       ferrous sulfate 324 (65 Fe) mg Take 1 tablet (324 mg total) by mouth daily before breakfast, Starting Tue 3/14/2023, Normal      norgestimate-ethinyl estradiol (Tri-Lo-Keiko) 0.18/0.215/0.25 MG-25 MCG per tablet Take 1 tablet by mouth daily, Starting Fri 1/27/2023, Normal               PDMP Review       None             ED Provider  Attending physically available and evaluated Woodrow RAMESH Webber. I managed the patient along with the ED Attending.    Electronically Signed by           Melita Leung DO  06/08/24 [id number]

## 2024-06-08 NOTE — ED ATTENDING ATTESTATION
6/8/2024  IMagnus DO, saw and evaluated the patient. I have discussed the patient with the resident/non-physician practitioner and agree with the resident's/non-physician practitioner's findings, Plan of Care, and MDM as documented in the resident's/non-physician practitioner's note, except where noted. All available labs and Radiology studies were reviewed.  I was present for key portions of any procedure(s) performed by the resident/non-physician practitioner and I was immediately available to provide assistance.       At this point I agree with the current assessment done in the Emergency Department.  I have conducted an independent evaluation of this patient a history and physical is as follows:    Patient is a 24-year-old female with no significant past medical history who presents with abdominal and back pain.  Patient states the abdominal pain started 3 days ago.  She describes periumbilical and right mid abdominal pain.  She states that it has been intermittent but more severe and more constant over the past 24 hours.  It does worsen with eating but occurs at times when she is not eating also.  She denies any alleviating factors.  Yesterday, she began having pain in her thoracic back as well.  She describes it between the shoulder blades.  It is not worse with movement or deep inspiration.  In fact, she states that it feels a little bit better when she is moving.  She denies shortness of breath, chest pain, cough, fever, chills.  She denies recent travel, recent surgeries, history of DVT/PE.    On exam, patient is in no acute distress.  Heart is regular rate and rhythm.  Breath sounds normal.  No tenderness to palpation in the thoracic back.  Abdomen is soft, mildly tender to palpation in the periumbilical region and right mid abdomen.  No tenderness at McBurney's point.  Negative Carrasquillo sign.    Possible gastritis versus peptic ulcer disease versus GERD.  Do not suspect acute surgical process  "including but not limited to acute cholecystitis, acute appendicitis, SBO, mesenteric ischemia, AAA, vascular dissection, vascular occlusion, perforated viscus, ectopic pregnancy. Do not suspect intrathoracic cause of abdominal pain. Symptoms improved and patient is tolerating po. Patient advised to return to ED if symptoms worsen or persist.  Will treat with H2 blocker, Carafate and Maalox.  Patient also advised to follow up with PCP, GI.     Portions of the above record have been created with voice recognition software.  Occasional wrong word or \"sound alike\" substitutions may have occurred due to the inherent limitations of voice recognition software.  Read the chart carefully and recognize, using context, where substitutions may have occurred.      ED Course         Critical Care Time  Procedures      "

## 2024-06-14 ENCOUNTER — OFFICE VISIT (OUTPATIENT)
Dept: GASTROENTEROLOGY | Facility: CLINIC | Age: 24
End: 2024-06-14
Payer: COMMERCIAL

## 2024-06-14 VITALS
BODY MASS INDEX: 19.36 KG/M2 | HEIGHT: 64 IN | DIASTOLIC BLOOD PRESSURE: 60 MMHG | WEIGHT: 113.4 LBS | SYSTOLIC BLOOD PRESSURE: 104 MMHG | TEMPERATURE: 98.3 F

## 2024-06-14 DIAGNOSIS — K59.04 CHRONIC IDIOPATHIC CONSTIPATION: ICD-10-CM

## 2024-06-14 DIAGNOSIS — R19.4 CHANGE IN BOWEL HABIT: ICD-10-CM

## 2024-06-14 DIAGNOSIS — R10.84 GENERALIZED ABDOMINAL PAIN: Primary | ICD-10-CM

## 2024-06-14 DIAGNOSIS — R11.0 NAUSEA: ICD-10-CM

## 2024-06-14 DIAGNOSIS — K21.9 GASTROESOPHAGEAL REFLUX DISEASE, UNSPECIFIED WHETHER ESOPHAGITIS PRESENT: ICD-10-CM

## 2024-06-14 PROCEDURE — 99244 OFF/OP CNSLTJ NEW/EST MOD 40: CPT | Performed by: PHYSICIAN ASSISTANT

## 2024-06-14 RX ORDER — FAMOTIDINE 40 MG/1
40 TABLET, FILM COATED ORAL
Qty: 90 TABLET | Refills: 1 | Status: SHIPPED | OUTPATIENT
Start: 2024-06-14

## 2024-06-14 RX ORDER — LINACLOTIDE 72 UG/1
72 CAPSULE, GELATIN COATED ORAL
Qty: 30 CAPSULE | Refills: 3 | Status: SHIPPED | OUTPATIENT
Start: 2024-06-14 | End: 2024-07-14

## 2024-06-14 RX ORDER — ONDANSETRON 4 MG/1
4 TABLET, ORALLY DISINTEGRATING ORAL EVERY 8 HOURS PRN
COMMUNITY
Start: 2024-06-07

## 2024-06-14 NOTE — H&P (VIEW-ONLY)
[facility] - Outpatient Consultation New Patient  Woodrow Webber 24 y.o. female MRN: [id number]  Encounter: [id number]  ASSESSMENT AND PLAN:    1. Generalized abdominal pain  2. Change in bowel habit  3. Chronic idiopathic constipation  Patient presenting with ongoing generalized abdominal pain, change in bowel habit, worsening constipation.  We reviewed her workup from the ER which revealed normal labs and CT imaging.  Thankfully her weight has been stable.  No rectal bleeding.  No prior EGD or colonoscopy.    At this time I recommend patient undergo both EGD and colonoscopy for further evaluation of ongoing symptoms.  Colonoscopy previously ordered by Morteza  She should continue with famotidine 40 mg once daily at bedtime  Will start patient on Linzess 72 mcg daily before breakfast.  Discussed with the patient that Linzess can cause initial diarrhea but I encouraged patient to take for 2 weeks to notice full effect.  Patient expressed understanding of this.  We discussed GERD diet/lifestyle.  I recommended patient avoid fatty, greasy, spicy foods.  We discussed the importance of a high-fiber diet.  I recommended patient eat plenty of fruits and vegetables, whole grains, seeds, nuts.  She expressed understanding.    I am hopeful her symptoms will improve with bowel regimen and acid suppression, diet changes    - Ambulatory Referral to Gastroenterology  - EGD; Future  - polyethylene glycol (GOLYTELY) 4000 mL solution; Take 4,000 mL by mouth once for 1 dose Take as directed by office instructions prior to colonoscopy.  Dispense: 4000 mL; Refill: 0  - linaCLOtide (Linzess) 72 MCG CAPS; Take 72 mcg by mouth daily before breakfast  Dispense: 30 capsule; Refill: 3    4. Nausea  5. Gastroesophageal reflux disease, unspecified whether esophagitis present  Plan as above    - famotidine (PEPCID) 40 MG tablet; Take 1 tablet (40 mg total) by mouth daily at bedtime  Dispense: 90 tablet; Refill:  1  - EGD; Future        I obtained informed consent from the patient the risk/benefits/alternatives of the procedure/s were discussed with the patient.  Risks included, but not limited to, infection, bleeding, perforation, injury to organs in the abdomen, missed lesion and incomplete procedure were discussed.  Patient was agreeable and electronic signature was obtained.      Patient was instructed to call the office with any questions, concerns, new/ worsening/ persisting GI symptoms. Advised patient go to the ER with any severe or worsening abdominal pain, fevers/ chills, intractable N/V, chest pain, SOB, dizziness, lightheadedness, feeling something stuck in esophagus that will not go down. Patient expressed understanding and is in agreement with treatment plan.       Will plan to follow up after diagnostic tests   ________________________________________________________    HPI:      Patient is new to me.  Patient with a past medical history of anxiety, depression presents to the office today for ER follow-up.  Patient was last seen in the office 11/16/2023 by Morteza Constantino, previous office note was reviewed.  Patient was seen in the emergency room 6/8/2024 with complaints of abdominal pain.  Emergency room note was reviewed.  Labs in ER and urinalysis all normal.  She underwent imaging with CT abdomen pelvis with contrast which showed no acute findings.  He was treated superiorly in the ER and told to follow-up with GI.  She was discharged with Maalox, famotidine, sucralfate.    Patient complains of abdominal pain x 1 year. She complains of worsening abdominal pain x 10 days.   Pain was significant, woke her up from sleep leading to ER.   Abdominal pain located throughout abdomen. The pain did radiate to her back. She describes the pain as sharp and achey. Sometimes crampy.   The abdominal pain is daily.  The abdominal pain can come and go.   Having a BM can sometimes help the pain but no always  She continues with  constipation x years. This has gotten worse as well. She can go up to 5 days without a BM.   For her constipation she has tried and failed MiraLAX daily, Colace daily, senna daily, fiber supplement daily, increasing fiber and water in diet, exercise, suppositories as needed.  She is not taking anything to help with Bms daily at this time. .   Woodrow notes associated nausea and heartburn, acid reflux. No improvement with medications from ER.  For her nausea and heartburn she has also tried over-the-counter Prilosec for few weeks without relief.  Her weight is the same from prior office visit.  Patient denies any fevers/ chills, unintentional weight loss, decreased appetite, vomiting, black or bloody stools, dysphagia, odynophagia.   Denies chest pain, SOB      No prior EGD or colonoscopy     Tobacco use- None  Alcohol use- None  NSAID use- occasional   No prior abdominal surgery     REVIEW OF SYSTEMS:    Review of Systems   Constitutional:  Negative for chills and fever.   HENT:  Negative for ear pain and sore throat.    Eyes:  Negative for pain and visual disturbance.   Respiratory:  Negative for cough and shortness of breath.    Cardiovascular:  Negative for chest pain and palpitations.   Gastrointestinal:  Positive for abdominal pain, constipation and nausea. Negative for vomiting.   Genitourinary:  Negative for dysuria, frequency and hematuria.   Musculoskeletal:  Negative for arthralgias, back pain and myalgias.   Skin:  Negative for color change and rash.   Neurological:  Negative for seizures, syncope and headaches.   All other systems reviewed and are negative.       Historical Information   Past Medical History:   Diagnosis Date    Anxiety     Clotting disorder ([facility])     Hearing loss     Last assessed 2/17/2014    Severe episode of recurrent major depressive disorder, without psychotic features ([facility]) 09/25/2020     History reviewed. No pertinent surgical history.  Social History   Social History     Substance  and Sexual Activity   Alcohol Use No     Social History     Substance and Sexual Activity   Drug Use Never     Social History     Tobacco Use   Smoking Status Never   Smokeless Tobacco Never     Family History   Problem Relation Age of Onset    No Known Problems Mother     Substance Abuse Father     Mental illness Father        Meds/Allergies       Current Outpatient Medications:     aluminum-magnesium hydroxide 200-200 MG/5ML suspension    dicyclomine (BENTYL) 10 mg capsule    famotidine (PEPCID) 40 MG tablet    ondansetron (ZOFRAN-ODT) 4 mg disintegrating tablet    sucralfate (CARAFATE) 1 g tablet    Apri 0.15-30 MG-MCG per tablet    ferrous sulfate 324 (65 Fe) mg    norgestimate-ethinyl estradiol (Tri-Lo-Keiko) 0.18/0.215/0.25 MG-25 MCG per tablet    No Known Allergies        Objective   Wt Readings from Last 3 Encounters:   06/14/24 51.4 kg (113 lb 6.4 oz)   12/08/23 51.9 kg (114 lb 8 oz)   11/16/23 50.7 kg (111 lb 12.8 oz)     Temp Readings from Last 3 Encounters:   06/14/24 98.3 °F (36.8 °C) (Tympanic)   06/08/24 98.3 °F (36.8 °C) (Oral)   12/08/23 97.6 °F (36.4 °C)     BP Readings from Last 3 Encounters:   06/14/24 104/60   06/08/24 120/81   12/08/23 108/66     Pulse Readings from Last 3 Encounters:   06/08/24 (!) 106   12/08/23 77   11/16/23 85        PHYSICAL EXAM:     Physical Exam  Vitals reviewed.   Constitutional:       General: She is not in acute distress.     Appearance: She is not toxic-appearing.   HENT:      Head: Normocephalic and atraumatic.   Eyes:      Extraocular Movements: Extraocular movements intact.      Conjunctiva/sclera: Conjunctivae normal.   Cardiovascular:      Rate and Rhythm: Normal rate and regular rhythm.   Pulmonary:      Effort: Pulmonary effort is normal. No respiratory distress.      Breath sounds: Normal breath sounds.   Abdominal:      General: Bowel sounds are normal.      Palpations: Abdomen is soft.      Tenderness: There is no abdominal tenderness.   Musculoskeletal:          General: No swelling or tenderness.      Cervical back: Normal range of motion and neck supple.   Skin:     General: Skin is warm and dry.      Coloration: Skin is not jaundiced.   Neurological:      General: No focal deficit present.      Mental Status: She is alert and oriented to person, place, and time. Mental status is at baseline.   Psychiatric:         Mood and Affect: Mood normal.         Behavior: Behavior normal.         Thought Content: Thought content normal.             Lab Results:   No visits with results within 1 Day(s) from this visit.   Latest known visit with results is:   Admission on 06/08/2024, Discharged on 06/08/2024   Component Date Value    WBC 06/08/2024 6.75     RBC 06/08/2024 4.72     Hemoglobin 06/08/2024 14.6     Hematocrit 06/08/2024 43.2     MCV 06/08/2024 92     MCH 06/08/2024 30.9     MCHC 06/08/2024 33.8     RDW 06/08/2024 11.6     MPV 06/08/2024 11.3     Platelets 06/08/2024 181     nRBC 06/08/2024 0     Segmented % 06/08/2024 71     Immature Grans % 06/08/2024 0     Lymphocytes % 06/08/2024 19     Monocytes % 06/08/2024 9     Eosinophils Relative 06/08/2024 1     Basophils Relative 06/08/2024 0     Absolute Neutrophils 06/08/2024 4.84     Absolute Immature Grans 06/08/2024 0.02     Absolute Lymphocytes 06/08/2024 1.25     Absolute Monocytes 06/08/2024 0.58     Eosinophils Absolute 06/08/2024 0.04     Basophils Absolute 06/08/2024 0.02     Sodium 06/08/2024 136     Potassium 06/08/2024 3.8     Chloride 06/08/2024 105     CO2 06/08/2024 25     ANION GAP 06/08/2024 6     BUN 06/08/2024 21     Creatinine 06/08/2024 0.77     Glucose 06/08/2024 84     Calcium 06/08/2024 9.7     AST 06/08/2024 16     ALT 06/08/2024 8     Alkaline Phosphatase 06/08/2024 54     Total Protein 06/08/2024 8.3     Albumin 06/08/2024 4.6     Total Bilirubin 06/08/2024 0.39     eGFR 06/08/2024 107     Lipase 06/08/2024 61     Color, UA 06/08/2024 Colorless     Clarity, UA 06/08/2024 Clear     Specific  Sherman, UA 06/08/2024 1.014     pH, UA 06/08/2024 6.5     Leukocytes, UA 06/08/2024 Negative     Nitrite, UA 06/08/2024 Negative     Protein, UA 06/08/2024 Negative     Glucose, UA 06/08/2024 Negative     Ketones, UA 06/08/2024 Negative     Urobilinogen, UA 06/08/2024 <2.0     Bilirubin, UA 06/08/2024 Negative     Occult Blood, UA 06/08/2024 Negative     EXT Preg Test, Ur 06/08/2024 Negative     Control 06/08/2024 Valid        Lab Results   Component Value Date    HOL8HPUAMVFB 2.033 12/04/2023       Lab Results   Component Value Date    CRP <1.0 12/04/2023       Prior celiac panel from 12/4/2023 reviewed and normal/negative    Radiology Results:   CT abdomen pelvis with contrast    Result Date: 6/8/2024  Narrative: CT ABDOMEN AND PELVIS WITH IV CONTRAST INDICATION: periumbilical abdominal pain(just to the right of midline) with nausea and diarrhea, concern for colitis, diverticulitis, appendicitis, less likely cholecystitis. COMPARISON: CT of the abdomen and pelvis 12/21/2023. TECHNIQUE: CT examination of the abdomen and pelvis was performed. Multiplanar 2D reformatted images were created from the source data. This examination, like all CT scans performed in the [facility], was performed utilizing techniques to minimize radiation dose exposure, including the use of iterative reconstruction and automated exposure control. Radiation dose length product (DLP) for this visit: 447 mGy-cm IV Contrast: 70 mL of iohexol (OMNIPAQUE) Enteric Contrast: Not administered. FINDINGS: ABDOMEN LOWER CHEST: No clinically significant abnormality in the visualized lower chest. LIVER/BILIARY TREE: Unremarkable. GALLBLADDER: No calcified gallstones. No pericholecystic inflammatory change. SPLEEN: Unremarkable. PANCREAS: Unremarkable. ADRENAL GLANDS: Unremarkable. KIDNEYS/URETERS: Unremarkable. No hydronephrosis. STOMACH AND BOWEL: No disproportionate dilation of the small or large bowel. APPENDIX: No findings to suggest  appendicitis. ABDOMINOPELVIC CAVITY: Small volume pelvic free fluid, physiologic. No pneumoperitoneum. No lymphadenopathy. VESSELS: Unremarkable for patient's age. PELVIS REPRODUCTIVE ORGANS: Probable left corpus luteum. URINARY BLADDER: Unremarkable. ABDOMINAL WALL/INGUINAL REGIONS: Unremarkable. BONES: No acute fracture or suspicious osseous lesion.     Impression: No acute findings in the abdomen or pelvis. Workstation performed: [id number]         Yumiko Barbosa PA-C   Available on [employer]ilib[employer]

## 2024-06-14 NOTE — PATIENT INSTRUCTIONS
Scheduled date of EGD/colonoscopy (as of today): 07/05/2024  Physician performing EGD/colonoscopy: Dr. Lanier  Location of EGD/colonoscopy[facility]  Desired bowel prep reviewed with patient: Golytely  Instructions reviewed with patient by: Shanae  Clearances:  N/A

## 2024-06-20 ENCOUNTER — ANESTHESIA EVENT (OUTPATIENT)
Dept: ANESTHESIOLOGY | Facility: HOSPITAL | Age: 24
End: 2024-06-20

## 2024-06-20 ENCOUNTER — ANESTHESIA (OUTPATIENT)
Dept: ANESTHESIOLOGY | Facility: HOSPITAL | Age: 24
End: 2024-06-20

## 2024-06-24 ENCOUNTER — PATIENT MESSAGE (OUTPATIENT)
Dept: GASTROENTEROLOGY | Facility: CLINIC | Age: 24
End: 2024-06-24

## 2024-07-04 ENCOUNTER — ANESTHESIA EVENT (OUTPATIENT)
Dept: ANESTHESIOLOGY | Facility: HOSPITAL | Age: 24
End: 2024-07-04

## 2024-07-04 ENCOUNTER — ANESTHESIA (OUTPATIENT)
Dept: ANESTHESIOLOGY | Facility: HOSPITAL | Age: 24
End: 2024-07-04

## 2024-07-05 ENCOUNTER — ANESTHESIA EVENT (OUTPATIENT)
Dept: GASTROENTEROLOGY | Facility: MEDICAL CENTER | Age: 24
End: 2024-07-05

## 2024-07-05 ENCOUNTER — HOSPITAL ENCOUNTER (OUTPATIENT)
Dept: GASTROENTEROLOGY | Facility: MEDICAL CENTER | Age: 24
Setting detail: OUTPATIENT SURGERY
End: 2024-07-05
Payer: COMMERCIAL

## 2024-07-05 ENCOUNTER — ANESTHESIA (OUTPATIENT)
Dept: GASTROENTEROLOGY | Facility: MEDICAL CENTER | Age: 24
End: 2024-07-05

## 2024-07-05 VITALS
HEART RATE: 68 BPM | TEMPERATURE: 97.9 F | WEIGHT: 113 LBS | HEIGHT: 64 IN | BODY MASS INDEX: 19.29 KG/M2 | RESPIRATION RATE: 14 BRPM | DIASTOLIC BLOOD PRESSURE: 50 MMHG | OXYGEN SATURATION: 100 % | SYSTOLIC BLOOD PRESSURE: 90 MMHG

## 2024-07-05 DIAGNOSIS — R10.84 GENERALIZED ABDOMINAL PAIN: ICD-10-CM

## 2024-07-05 DIAGNOSIS — R10.30 LOWER ABDOMINAL PAIN: ICD-10-CM

## 2024-07-05 DIAGNOSIS — K21.9 GASTROESOPHAGEAL REFLUX DISEASE, UNSPECIFIED WHETHER ESOPHAGITIS PRESENT: ICD-10-CM

## 2024-07-05 DIAGNOSIS — R11.0 NAUSEA: ICD-10-CM

## 2024-07-05 LAB
EXT PREGNANCY TEST URINE: NEGATIVE
EXT. CONTROL: NORMAL

## 2024-07-05 PROCEDURE — 81025 URINE PREGNANCY TEST: CPT | Performed by: ANESTHESIOLOGY

## 2024-07-05 PROCEDURE — 88305 TISSUE EXAM BY PATHOLOGIST: CPT | Performed by: PATHOLOGY

## 2024-07-05 PROCEDURE — 45378 DIAGNOSTIC COLONOSCOPY: CPT | Performed by: INTERNAL MEDICINE

## 2024-07-05 PROCEDURE — 43239 EGD BIOPSY SINGLE/MULTIPLE: CPT | Performed by: INTERNAL MEDICINE

## 2024-07-05 RX ORDER — LIDOCAINE HYDROCHLORIDE 20 MG/ML
INJECTION, SOLUTION EPIDURAL; INFILTRATION; INTRACAUDAL; PERINEURAL AS NEEDED
Status: DISCONTINUED | OUTPATIENT
Start: 2024-07-05 | End: 2024-07-05

## 2024-07-05 RX ORDER — PROPOFOL 10 MG/ML
INJECTION, EMULSION INTRAVENOUS AS NEEDED
Status: DISCONTINUED | OUTPATIENT
Start: 2024-07-05 | End: 2024-07-05

## 2024-07-05 RX ORDER — SODIUM CHLORIDE 9 MG/ML
INJECTION, SOLUTION INTRAVENOUS CONTINUOUS PRN
Status: DISCONTINUED | OUTPATIENT
Start: 2024-07-05 | End: 2024-07-05

## 2024-07-05 RX ORDER — SODIUM CHLORIDE 9 MG/ML
125 INJECTION, SOLUTION INTRAVENOUS CONTINUOUS
Status: SHIPPED | OUTPATIENT
Start: 2024-07-05

## 2024-07-05 RX ORDER — SODIUM CHLORIDE 9 MG/ML
125 INJECTION, SOLUTION INTRAVENOUS CONTINUOUS
Status: CANCELLED | OUTPATIENT
Start: 2024-07-05

## 2024-07-05 RX ORDER — PROPOFOL 10 MG/ML
INJECTION, EMULSION INTRAVENOUS CONTINUOUS PRN
Status: DISCONTINUED | OUTPATIENT
Start: 2024-07-05 | End: 2024-07-05

## 2024-07-05 RX ADMIN — PROPOFOL 50 MG: 10 INJECTION, EMULSION INTRAVENOUS at 13:09

## 2024-07-05 RX ADMIN — PROPOFOL 50 MG: 10 INJECTION, EMULSION INTRAVENOUS at 13:13

## 2024-07-05 RX ADMIN — PROPOFOL 40 MG: 10 INJECTION, EMULSION INTRAVENOUS at 13:23

## 2024-07-05 RX ADMIN — PROPOFOL 200 MG: 10 INJECTION, EMULSION INTRAVENOUS at 13:08

## 2024-07-05 RX ADMIN — SODIUM CHLORIDE 125 ML/HR: 0.9 INJECTION, SOLUTION INTRAVENOUS at 12:56

## 2024-07-05 RX ADMIN — PROPOFOL 120 MCG/KG/MIN: 10 INJECTION, EMULSION INTRAVENOUS at 13:29

## 2024-07-05 RX ADMIN — SODIUM CHLORIDE: 0.9 INJECTION, SOLUTION INTRAVENOUS at 13:04

## 2024-07-05 RX ADMIN — LIDOCAINE HYDROCHLORIDE 80 MG: 20 INJECTION, SOLUTION EPIDURAL; INFILTRATION; INTRACAUDAL at 13:08

## 2024-07-05 RX ADMIN — PROPOFOL 150 MCG/KG/MIN: 10 INJECTION, EMULSION INTRAVENOUS at 13:10

## 2024-07-05 RX ADMIN — PROPOFOL 50 MG: 10 INJECTION, EMULSION INTRAVENOUS at 13:32

## 2024-07-05 NOTE — ANESTHESIA POSTPROCEDURE EVALUATION
Post-Op Assessment Note    CV Status:  Stable  Pain Score: 0    Pain management: adequate       Mental Status:  Sleepy   Hydration Status:  Euvolemic   PONV Controlled:  Controlled   Airway Patency:  Patent     Post Op Vitals Reviewed: Yes    No anethesia notable event occurred.    Staff: CRNA               BP   91/50   Temp      Pulse  74   Resp   20   SpO2   99%

## 2024-07-05 NOTE — ANESTHESIA PREPROCEDURE EVALUATION
Procedure:  COLONOSCOPY  EGD    Relevant Problems   ANESTHESIA (within normal limits)      CARDIO (within normal limits)      ENDO (within normal limits)      GI/HEPATIC (within normal limits)      /RENAL (within normal limits)      GYN (within normal limits)      HEMATOLOGY (within normal limits)      MUSCULOSKELETAL (within normal limits)      NEURO/PSYCH   (+) ROXANA (generalized anxiety disorder)   (+) Moderate episode of recurrent major depressive disorder (HCC)      PULMONARY   (+) Dyspnea        Physical Exam    Airway    Mallampati score: I  TM Distance: >3 FB  Neck ROM: full     Dental   No notable dental hx     Cardiovascular  Rhythm: regular, Rate: normal, Cardiovascular exam normal    Pulmonary      Other Findings  post-pubertal.      Anesthesia Plan  ASA Score- 2     Anesthesia Type- IV sedation with anesthesia with ASA Monitors.         Additional Monitors:     Airway Plan:            Plan Factors-Exercise tolerance (METS): >4 METS.    Chart reviewed.    Patient summary reviewed.    Patient is not a current smoker.              Induction- intravenous.    Postoperative Plan-         Informed Consent- Anesthetic plan and risks discussed with patient.

## 2024-07-05 NOTE — ANESTHESIA PREPROCEDURE EVALUATION
Procedure:  PRE-OP ONLY    Relevant Problems   ANESTHESIA (within normal limits)      CARDIO (within normal limits)      ENDO (within normal limits)      GI/HEPATIC (within normal limits)      /RENAL (within normal limits)      GYN (within normal limits)      HEMATOLOGY (within normal limits)      MUSCULOSKELETAL (within normal limits)      NEURO/PSYCH   (+) ROXANA (generalized anxiety disorder)   (+) Moderate episode of recurrent major depressive disorder (HCC)      PULMONARY   (+) Dyspnea             Anesthesia Plan  ASA Score- 2     Anesthesia Type- IV sedation with anesthesia with ASA Monitors.         Additional Monitors:     Airway Plan:            Plan Factors-Exercise tolerance (METS): >4 METS.    Chart reviewed.    Patient summary reviewed.    Patient is not a current smoker.              Induction- intravenous.    Postoperative Plan-         Informed Consent- Anesthetic plan and risks discussed with patient.

## 2024-07-05 NOTE — INTERVAL H&P NOTE
H&P reviewed. After examining the patient I find no changes in the patients condition since the H&P had been written. EGD and colonoscopy for abd pain, change in BMs, worsening constipation, GERD.    Vitals:    07/05/24 1249   BP: 108/66   Pulse: 92   Resp: 16   Temp: 97.9 °F (36.6 °C)   SpO2: 100%

## 2024-07-08 PROCEDURE — 88305 TISSUE EXAM BY PATHOLOGIST: CPT | Performed by: PATHOLOGY

## 2024-08-21 ENCOUNTER — TELEPHONE (OUTPATIENT)
Age: 24
End: 2024-08-21

## 2024-08-21 NOTE — TELEPHONE ENCOUNTER
Patients GI provider:  TANYA Barbosa    Number to return call: ([phone]    Reason for call: Tamica from [employer] calling to confirm we received a fax from them.  Confirmed it has not been uploaded in the chart as of now.  Confirmed the fax number is correct and advised since it was just faxed over it will be uploaded soon.   Scheduled procedure/appointment date if applicable: Appt 9/24/24

## 2024-08-22 NOTE — TELEPHONE ENCOUNTER
Tamica from [employer] called to confirm we received the fax that was sent yesterday and this morning for drug therapy change, confirmed it takes up to 48 hours to upload to the chart, advised it has not been uploaded yet.

## 2024-08-29 ENCOUNTER — TELEPHONE (OUTPATIENT)
Dept: GASTROENTEROLOGY | Facility: CLINIC | Age: 24
End: 2024-08-29

## 2024-08-29 NOTE — TELEPHONE ENCOUNTER
----- Message from Yumiko Barbosa PA-C sent at 8/29/2024  1:59 PM EDT -----  Regarding: Linzess  Please call patient and let her know that I received a fax regarding her Linzess and potential alternatives.  Please asked the patient has she picked up the Linzess?  Is her insurance covering this?  Is it working for her?    Any questions or concerns please let me know.   Thanks!  Yumiko Barbosa PA-C

## 2024-08-29 NOTE — TELEPHONE ENCOUNTER
Called pt and left message to call the office and let us know if she was able to get Linzess. If it was picked up and if so does it help. If not we have some alternatives we can do.

## 2024-08-30 ENCOUNTER — TELEPHONE (OUTPATIENT)
Dept: GASTROENTEROLOGY | Facility: CLINIC | Age: 24
End: 2024-08-30

## 2024-08-30 NOTE — TELEPHONE ENCOUNTER
----- Message from Yumiko Barbosa PA-C sent at 8/29/2024  1:59 PM EDT -----  Regarding: Linzess  Please call patient and let her know that I received a fax regarding her Linzess and potential alternatives.  Please asked the patient has she picked up the Linzess?  Is her insurance covering this?  Is it working for her?    Any questions or concerns please let me know.   Thanks!  Yumiko Brabosa PA-C

## 2024-09-03 NOTE — TELEPHONE ENCOUNTER
Phone call to pharmacy to f/u with pt's Linzess, pharmacist confirms pt has picked up her Linzess.

## 2024-09-16 ENCOUNTER — RA CDI HCC (OUTPATIENT)
Dept: OTHER | Facility: HOSPITAL | Age: 24
End: 2024-09-16

## 2024-09-16 NOTE — PROGRESS NOTES
[F33.1]     NOT on the BPA- please assess using MEAT for 2024 billing    HCC coding opportunities          Chart Reviewed number of suggestions sent to Provider: 1     Patients Insurance        Commercial Insurance: [employer]

## 2024-09-19 ENCOUNTER — TELEPHONE (OUTPATIENT)
Dept: GASTROENTEROLOGY | Facility: CLINIC | Age: 24
End: 2024-09-19

## 2024-09-19 NOTE — TELEPHONE ENCOUNTER
----- Message from Yumiko Barbosa PA-C sent at 9/19/2024  1:43 PM EDT -----  Regarding: Linzess  Please call patient and let her know that I received another  fax regarding her Linzess and potential alternatives.  Please ask the patient is her insurance covering this?  Is it working for her?    Any questions or concerns please let me know.   Thanks!  Yumiko Barbosa PA-C

## 2024-12-30 ENCOUNTER — TELEPHONE (OUTPATIENT)
Age: 24
End: 2024-12-30

## 2024-12-31 DIAGNOSIS — R04.0 RECURRENT EPISTAXIS: Primary | ICD-10-CM

## 2024-12-31 NOTE — TELEPHONE ENCOUNTER
Patient is requesting a referral for ENT, she stated that Dr. Mayo cauterized her nose and she is still getting multiple nose bleeds every single day.     Please advise

## 2025-02-06 ENCOUNTER — TRANSCRIBE ORDERS (OUTPATIENT)
Dept: GASTROENTEROLOGY | Facility: CLINIC | Age: 25
End: 2025-02-06

## 2025-02-07 DIAGNOSIS — K21.9 GASTROESOPHAGEAL REFLUX DISEASE, UNSPECIFIED WHETHER ESOPHAGITIS PRESENT: ICD-10-CM

## 2025-02-07 RX ORDER — FAMOTIDINE 40 MG/1
40 TABLET, FILM COATED ORAL
Qty: 90 TABLET | Refills: 1 | Status: SHIPPED | OUTPATIENT
Start: 2025-02-07